# Patient Record
Sex: FEMALE | Race: WHITE | NOT HISPANIC OR LATINO | Employment: PART TIME | URBAN - METROPOLITAN AREA
[De-identification: names, ages, dates, MRNs, and addresses within clinical notes are randomized per-mention and may not be internally consistent; named-entity substitution may affect disease eponyms.]

---

## 2017-04-14 ENCOUNTER — HOSPITAL ENCOUNTER (EMERGENCY)
Facility: HOSPITAL | Age: 22
Discharge: HOME | End: 2017-04-14
Attending: SURGERY
Payer: COMMERCIAL

## 2017-04-14 VITALS
DIASTOLIC BLOOD PRESSURE: 76 MMHG | SYSTOLIC BLOOD PRESSURE: 118 MMHG | RESPIRATION RATE: 16 BRPM | WEIGHT: 140 LBS | HEART RATE: 80 BPM | TEMPERATURE: 98 F

## 2017-04-14 DIAGNOSIS — R51.9 NONINTRACTABLE HEADACHE, UNSPECIFIED CHRONICITY PATTERN, UNSPECIFIED HEADACHE TYPE: ICD-10-CM
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
## 2017-04-14 PROCEDURE — 25000003 PHARM REV CODE 250: Performed by: SURGERY

## 2017-04-14 PROCEDURE — 99284 EMERGENCY DEPT VISIT MOD MDM: CPT

## 2017-04-14 RX ORDER — KETOROLAC TROMETHAMINE 10 MG/1
10 TABLET, FILM COATED ORAL EVERY 6 HOURS PRN
Qty: 15 TABLET | Refills: 0 | Status: SHIPPED | OUTPATIENT
Start: 2017-04-14 | End: 2018-05-07

## 2017-04-14 RX ORDER — ACETAMINOPHEN 500 MG
1000 TABLET ORAL
Status: COMPLETED | OUTPATIENT
Start: 2017-04-14 | End: 2017-04-14

## 2017-04-14 RX ORDER — CYCLOBENZAPRINE HCL 10 MG
10 TABLET ORAL 3 TIMES DAILY PRN
Qty: 10 TABLET | Refills: 0 | Status: SHIPPED | OUTPATIENT
Start: 2017-04-14 | End: 2017-04-19

## 2017-04-14 RX ADMIN — ACETAMINOPHEN 1000 MG: 500 TABLET ORAL at 10:04
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
## 2017-04-15 NOTE — DISCHARGE INSTRUCTIONS
"  Headache, Unspecified    A number of things can cause headaches. The cause of your headache isnt clear. But it doesnt seem to be a sign of any serious illness.  You could have a tension headache or a migraine headache.  Stress can cause a tension headache. This can happen if you tense the muscles of your shoulders, neck, and scalp without knowing it. If this stress lasts long enough, you may develop a tension headache.  It is not clear why migraines occur, but certain things called" triggers" can raise the risk of having a migraine attack. Migraine triggers may include emotional stress or depression, or by hormone changes during the menstrual cycle. Other triggers include birth control pills and other medicines, alcohol or caffeine, foods with tyramine (such as aged cheese, wine), eyestrain, weather changes, missed meals, and lack of sleep or oversleeping.  Other causes of headache include:  · Viral illness with high fever  · Head injury with concussion  · Sinus, ear, or throat infection  · Dental pain and jaw joint (TMJ) pain  More serious but less common causes of headache include stroke, brain hemorrhage, brain tumor, meningitis, and encephalitis.  Home care  Follow these tips when taking care of yourself at home:  · Dont drive yourself home if you were given pain medicine for your headache. Instead, have someone else drive you home. Try to sleep when you get home. You should feel much better when you wake up.  · Apply heat to the back of your neck to ease a neck muscle spasm. Take care of a migraine headache by putting an ice pack on your forehead or at the base of your skull.  · If you have nausea or vomiting, eat a light diet until your headache eases.  · If you have a migraine headache, use sunglasses when in the daylight or around bright indoor lighting until your symptoms get better. Bright glaring light can make this type of headache worse.  Follow-up care  Follow up with your healthcare provider, or " as advised. Talk with your provider if you have frequent headaches. He or she can help figure out a treatment plan. By knowing the earliest signs of headache, and starting treatment right away, you may be able to stop the pain yourself.  When to seek medical advice  Call your healthcare provider right away if any of these occur:  · Your head pain suddenly gets worse after sexual intercourse or strenuous activity  · Your head pain doesnt get better within 24 hours  · You arent able to keep liquids down (repeated vomiting)  · Fever of 100.4ºF (38ºC) or higher, or as directed by your healthcare provider  · Stiff neck  · Extreme drowsiness, confusion, or fainting  · Dizziness or dizziness with spinning sensation (vertigo)  · Weakness in an arm or leg or one side of your face  · You have trouble talking or seeing  Date Last Reviewed: 8/1/2016  © 0160-5106 [employer]. [address] [place] [address], [place] [address]. All rights reserved. This information is not intended as a substitute for professional medical care. Always follow your healthcare professional's instructions.
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
## 2018-05-29 ENCOUNTER — LAB VISIT (OUTPATIENT)
Dept: LAB | Facility: HOSPITAL | Age: 23
End: 2018-05-29
Attending: OBSTETRICS & GYNECOLOGY
Payer: MEDICAID

## 2018-05-29 ENCOUNTER — OFFICE VISIT (OUTPATIENT)
Dept: OBSTETRICS AND GYNECOLOGY | Facility: CLINIC | Age: 23
End: 2018-05-29
Payer: MEDICAID

## 2018-05-29 VITALS
DIASTOLIC BLOOD PRESSURE: 80 MMHG | SYSTOLIC BLOOD PRESSURE: 120 MMHG | HEIGHT: 63 IN | WEIGHT: 194 LBS | HEART RATE: 88 BPM | RESPIRATION RATE: 10 BRPM | BODY MASS INDEX: 34.38 KG/M2

## 2018-05-29 DIAGNOSIS — Z32.01 POSITIVE PREGNANCY TEST: ICD-10-CM

## 2018-05-29 DIAGNOSIS — Z11.3 SCREEN FOR STD (SEXUALLY TRANSMITTED DISEASE): ICD-10-CM

## 2018-05-29 DIAGNOSIS — Z12.4 CERVICAL CANCER SCREENING: ICD-10-CM

## 2018-05-29 DIAGNOSIS — N91.2 AMENORRHEA: Primary | ICD-10-CM

## 2018-05-29 LAB
ABO + RH BLD: NORMAL
ALBUMIN SERPL BCP-MCNC: 4 G/DL
ALP SERPL-CCNC: 44 U/L
ALT SERPL W/O P-5'-P-CCNC: 18 U/L
ANION GAP SERPL CALC-SCNC: 9 MMOL/L
AST SERPL-CCNC: 17 U/L
B-HCG UR QL: POSITIVE
BASOPHILS # BLD AUTO: 0.04 K/UL
BASOPHILS NFR BLD: 0.3 %
BILIRUB SERPL-MCNC: 0.3 MG/DL
BLD GP AB SCN CELLS X3 SERPL QL: NORMAL
BUN SERPL-MCNC: 6 MG/DL
CALCIUM SERPL-MCNC: 9.4 MG/DL
CHLORIDE SERPL-SCNC: 105 MMOL/L
CO2 SERPL-SCNC: 24 MMOL/L
CREAT SERPL-MCNC: 0.7 MG/DL
CTP QC/QA: YES
DIFFERENTIAL METHOD: ABNORMAL
EOSINOPHIL # BLD AUTO: 0.1 K/UL
EOSINOPHIL NFR BLD: 1.1 %
ERYTHROCYTE [DISTWIDTH] IN BLOOD BY AUTOMATED COUNT: 12.3 %
EST. GFR  (AFRICAN AMERICAN): >60 ML/MIN/1.73 M^2
EST. GFR  (NON AFRICAN AMERICAN): >60 ML/MIN/1.73 M^2
GLUCOSE SERPL-MCNC: 87 MG/DL
HCT VFR BLD AUTO: 38.9 %
HGB BLD-MCNC: 13.3 G/DL
LYMPHOCYTES # BLD AUTO: 3.2 K/UL
LYMPHOCYTES NFR BLD: 26.9 %
MCH RBC QN AUTO: 32.8 PG
MCHC RBC AUTO-ENTMCNC: 34.2 G/DL
MCV RBC AUTO: 96 FL
MONOCYTES # BLD AUTO: 1 K/UL
MONOCYTES NFR BLD: 8.7 %
NEUTROPHILS # BLD AUTO: 7.4 K/UL
NEUTROPHILS NFR BLD: 63 %
PLATELET # BLD AUTO: 308 K/UL
PMV BLD AUTO: 10.7 FL
POTASSIUM SERPL-SCNC: 4.1 MMOL/L
PROT SERPL-MCNC: 7.3 G/DL
RBC # BLD AUTO: 4.06 M/UL
SODIUM SERPL-SCNC: 138 MMOL/L
WBC # BLD AUTO: 11.71 K/UL

## 2018-05-29 PROCEDURE — 81025 URINE PREGNANCY TEST: CPT | Mod: PBBFAC | Performed by: OBSTETRICS & GYNECOLOGY

## 2018-05-29 PROCEDURE — 85025 COMPLETE CBC W/AUTO DIFF WBC: CPT

## 2018-05-29 PROCEDURE — 81220 CFTR GENE COM VARIANTS: CPT

## 2018-05-29 PROCEDURE — 88175 CYTOPATH C/V AUTO FLUID REDO: CPT

## 2018-05-29 PROCEDURE — 86703 HIV-1/HIV-2 1 RESULT ANTBDY: CPT

## 2018-05-29 PROCEDURE — 80053 COMPREHEN METABOLIC PANEL: CPT

## 2018-05-29 PROCEDURE — 99204 OFFICE O/P NEW MOD 45 MIN: CPT | Mod: 25,S$PBB,TH, | Performed by: OBSTETRICS & GYNECOLOGY

## 2018-05-29 PROCEDURE — 80307 DRUG TEST PRSMV CHEM ANLYZR: CPT

## 2018-05-29 PROCEDURE — 86762 RUBELLA ANTIBODY: CPT

## 2018-05-29 PROCEDURE — 99999 PR PBB SHADOW E&M-EST. PATIENT-LVL III: CPT | Mod: PBBFAC,,, | Performed by: OBSTETRICS & GYNECOLOGY

## 2018-05-29 PROCEDURE — 86901 BLOOD TYPING SEROLOGIC RH(D): CPT

## 2018-05-29 PROCEDURE — 99213 OFFICE O/P EST LOW 20 MIN: CPT | Mod: PBBFAC,TH,25 | Performed by: OBSTETRICS & GYNECOLOGY

## 2018-05-29 PROCEDURE — 36415 COLL VENOUS BLD VENIPUNCTURE: CPT

## 2018-05-29 PROCEDURE — 86592 SYPHILIS TEST NON-TREP QUAL: CPT

## 2018-05-29 PROCEDURE — 87491 CHLMYD TRACH DNA AMP PROBE: CPT

## 2018-05-29 PROCEDURE — 87340 HEPATITIS B SURFACE AG IA: CPT

## 2018-05-29 RX ORDER — PROMETHAZINE HYDROCHLORIDE 25 MG/1
25 TABLET ORAL EVERY 6 HOURS PRN
Qty: 30 TABLET | Refills: 0 | Status: SHIPPED | OUTPATIENT
Start: 2018-05-29 | End: 2018-06-28

## 2018-05-29 RX ORDER — BUSPIRONE HYDROCHLORIDE 5 MG/1
5 TABLET ORAL 2 TIMES DAILY
Qty: 60 TABLET | Refills: 1 | Status: SHIPPED | OUTPATIENT
Start: 2018-05-29 | End: 2018-07-06 | Stop reason: SDUPTHER

## 2018-05-29 RX ORDER — ONDANSETRON 4 MG/1
4 TABLET, FILM COATED ORAL EVERY 8 HOURS PRN
Qty: 30 TABLET | Refills: 1 | Status: SHIPPED | OUTPATIENT
Start: 2018-05-29 | End: 2018-07-06 | Stop reason: SDUPTHER

## 2018-05-29 NOTE — PROGRESS NOTES
Subjective:   Patient ID: Wilmer Honeycutt is a 22 y.o. y.o. female.     Chief Complaint: Missed Menses       History of Present Illness:    Wilmer presents today complaining of amenorrhea. Patient's last menstrual period was 04/19/2018.. Prior menstrual cycles have been regular. She reports positive home pregnancy test. UPT is positive.    Patient reports having severe nausea and requesting medication. She also suffers from depression and anxiety and reports her anxiety is much worse lately; requesting medication.     Denies any vaginal bleeding or cramping.        History reviewed. No pertinent past medical history.  History reviewed. No pertinent surgical history.  Social History     Social History    Marital status: Single     Spouse name: N/A    Number of children: N/A    Years of education: N/A     Social History Main Topics    Smoking status: Current Every Day Smoker     Packs/day: 0.25     Types: Cigarettes    Smokeless tobacco: Never Used    Alcohol use Yes      Comment: occ    Drug use: No    Sexual activity: Not Asked      Comment: Single     Other Topics Concern    None     Social History Narrative    None     Family History   Problem Relation Age of Onset    Ovarian cancer Maternal Grandmother     Colon cancer Neg Hx     Breast cancer Neg Hx      OB History   No data available         ROS:   Constitutional: Negative for chills, fever and weight loss.   HENT: Negative for congestion, ear discharge, hearing loss and nosebleeds.   Eyes: Negative for blurred vision and double vision.   Respiratory: Negative for cough, hemoptysis, sputum production and shortness of breath.   Cardiovascular: Negative for chest pain, palpitations and orthopnea.   Gastrointestinal: Negative for abdominal pain, heartburn, nausea and vomiting.   Genitourinary: Negative for dysuria, frequency, hematuria and urgency.   Musculoskeletal: Negative for back pain, myalgias and neck pain.   Skin: Negative for itching and  rash.   Neurological: Negative for dizziness, tingling, tremors and headaches.   Endo/Heme/Allergies: Negative for environmental allergies and polydipsia. Does not bruise/bleed easily.   Psychiatric/Behavioral: Negative for depression, hallucinations and substance abuse. The patient is not nervous/anxious.       Objective:   Vital Signs:  There were no vitals filed for this visit.      Physical Exam:  General:  alert, cooperative, appears stated age   Skin:  Skin color, texture, turgor normal. No rashes or lesions   HEENT:  conjunctivae/corneas clear. PERRL.   Neck: supple, trachea midline, no adenopathy or thyromegally   Respiratory:  clear to auscultation bilaterally   Heart:  regular rate and rhythm, S1, S2 normal, no murmur, click, rub or gallop   Breasts:  no discharge, erythema, or tenderness   Abdomen:  normal findings: bowel sounds normal, no masses palpable, no organomegaly and soft, non-tender   Pelvis: External genitalia: normal general appearance  Urinary system: urethral meatus normal, bladder nontender  Vaginal: normal mucosa without prolapse or lesions  Cervix: normal appearance  Uterus: normal size, shape, position  Adnexa: normal size, nontender bilaterally   Extremities: Normal ROM; no edema, no cyanosis   Neurologial: Normal strength and tone. No focal numbness or weakness. Reflexes 2+ and equal.   Psychiatric: normal mood, speech, dress, and thought processes     Assessment:      1. Amenorrhea    2. Positive pregnancy test    3. Screen for STD (sexually transmitted disease)    4. Cervical cancer screening          Plan:      Amenorrhea  -     US OB/GYN Procedure ([facility]) - Extended List - Future; Future    Positive pregnancy test  -     Comprehensive metabolic panel; Future; Expected date: 05/29/2018  -     Type & Screen - Ob Profile; Future; Expected date: 05/29/2018  -     CBC auto differential; Future; Expected date: 05/29/2018  -     HIV-1 and HIV-2 antibodies; Future; Expected date:  05/29/2018  -     Hepatitis B surface antigen; Future; Expected date: 05/29/2018  -     Rubella antibody, IgG; Future; Expected date: 05/29/2018  -     RPR; Future; Expected date: 05/29/2018  -     Cystic Fibrosis Mutation Panel; Future; Expected date: 05/29/2018    Screen for STD (sexually transmitted disease)  -     C. trachomatis/N. gonorrhoeae by AMP DNA Cervix    Cervical cancer screening  -     Liquid-based pap smear, screening      Pap smear  GC/CT  Rx of Zofran  Rx of Buspar  Dating scan next week  PNV  Prenatal labs  RTC in 4 weeks      Patient was counseled today on proper weight gain based on the [facility] [employer] [facility]'s recommendations based on her pre-pregnancy weight. Discussed foods to avoid in pregnancy (i.e. sushi, fish that are high in mercury, deli meat, and unpasteurized cheeses). Discussed prenatal vitamin options (i.e. stool softener, DHA). She was also counseled on safe, healthy behavior as well as medications safe in pregnancy.

## 2018-05-30 LAB
AMPHET+METHAMPHET UR QL: NEGATIVE
BARBITURATES UR QL SCN>200 NG/ML: NEGATIVE
BENZODIAZ UR QL SCN>200 NG/ML: NEGATIVE
BZE UR QL SCN: NEGATIVE
C TRACH DNA SPEC QL NAA+PROBE: DETECTED
CANNABINOIDS UR QL SCN: NEGATIVE
CREAT UR-MCNC: 269 MG/DL
ETHANOL UR-MCNC: <10 MG/DL
HBV SURFACE AG SERPL QL IA: NEGATIVE
HIV 1+2 AB+HIV1 P24 AG SERPL QL IA: NEGATIVE
METHADONE UR QL SCN>300 NG/ML: NEGATIVE
N GONORRHOEA DNA SPEC QL NAA+PROBE: NOT DETECTED
OPIATES UR QL SCN: NEGATIVE
PCP UR QL SCN>25 NG/ML: NEGATIVE
RPR SER QL: NORMAL
RUBV IGG SER-ACNC: 19 IU/ML
RUBV IGG SER-IMP: REACTIVE
TOXICOLOGY INFORMATION: NORMAL

## 2018-05-31 DIAGNOSIS — A74.9 CHLAMYDIA INFECTION AFFECTING PREGNANCY, ANTEPARTUM: ICD-10-CM

## 2018-05-31 DIAGNOSIS — O98.819 CHLAMYDIA INFECTION AFFECTING PREGNANCY, ANTEPARTUM: ICD-10-CM

## 2018-05-31 RX ORDER — AZITHROMYCIN 250 MG/1
TABLET, FILM COATED ORAL
Qty: 4 TABLET | Refills: 0 | Status: SHIPPED | OUTPATIENT
Start: 2018-05-31 | End: 2018-06-05

## 2018-06-01 LAB — CFTR MUT ANL BLD/T: NORMAL

## 2018-06-04 ENCOUNTER — TELEPHONE (OUTPATIENT)
Dept: OBSTETRICS AND GYNECOLOGY | Facility: CLINIC | Age: 23
End: 2018-06-04

## 2018-06-05 ENCOUNTER — PROCEDURE VISIT (OUTPATIENT)
Dept: OBSTETRICS AND GYNECOLOGY | Facility: CLINIC | Age: 23
End: 2018-06-05
Payer: MEDICAID

## 2018-06-05 DIAGNOSIS — N91.2 AMENORRHEA: ICD-10-CM

## 2018-06-05 PROCEDURE — 76805 OB US >/= 14 WKS SNGL FETUS: CPT | Mod: PBBFAC | Performed by: OBSTETRICS & GYNECOLOGY

## 2018-06-05 NOTE — Clinical Note
Leticia Joyner is asking about the u/s performed on 6/5/18.  There is no image interpretation on the u/s.  Please advise.   Kindly,   Grecia

## 2018-06-08 RX ORDER — METRONIDAZOLE 500 MG/1
500 TABLET ORAL EVERY 12 HOURS
Qty: 14 TABLET | Refills: 0 | Status: SHIPPED | OUTPATIENT
Start: 2018-06-08 | End: 2018-06-15

## 2018-06-08 RX ORDER — PROMETHAZINE HYDROCHLORIDE 25 MG/1
25 TABLET ORAL EVERY 6 HOURS PRN
Qty: 30 TABLET | Refills: 0 | Status: CANCELLED | OUTPATIENT
Start: 2018-06-08 | End: 2018-07-08

## 2018-06-08 NOTE — TELEPHONE ENCOUNTER
Explained to pt about her pap and her positive vaginosis. Pt is having more milky discharge. No itching and has some odor.

## 2018-06-08 NOTE — TELEPHONE ENCOUNTER
----- Message from Shruthi Tobar MA sent at 6/8/2018 10:14 AM CDT -----  Contact: SELF  Wilmer Honeycutt  MRN: [id number]  Home Phone      [phone]  Work Phone      Not on file.  Mobile          [phone]    Patient Care Team:  Primary Doctor No as PCP - General  Tita Castillo MD (Obstetrics and Gynecology)  OB? Yes, Unknown  What phone number can you be reached at?   [phone]  Message:   Needs refill on promethazine .  PHARMACY:  [facility]

## 2018-06-08 NOTE — TELEPHONE ENCOUNTER
Newly diagnosed ob states she has taken 30 Phenergan since 5/29/18. States she is having to take medication 3-4 times daily. Patient is not using Zofran. Requesting refill. Please advise.

## 2018-06-08 NOTE — TELEPHONE ENCOUNTER
Please let patient know I have sent in a Rx for the BV. Also please inquire if she is taking the Zofran with the phenergan. I am concerned she has needed 30 pills in ~ 1 week.

## 2018-06-08 NOTE — TELEPHONE ENCOUNTER
Patient notified BV medication has been sent to her pharmacy. Patient states she is not taking Zofran for nausea only Phenergan. Dr. Mary notified, states Phenergan will not be filled at this time, instruct patient to take Zofran. Patient verbalized understanding with no questions or concerns.

## 2018-06-08 NOTE — TELEPHONE ENCOUNTER
----- Message from Jennifer Finch MD sent at 6/2/2018  8:47 PM CDT -----  Please notify patient that her Pap is normal.  However, there was bacterial vaginosis noted.  This would typically be treated in her second trimester unless she is having any bothersome vaginal discharge or irritation prior to that time.

## 2018-07-06 ENCOUNTER — INITIAL PRENATAL (OUTPATIENT)
Dept: OBSTETRICS AND GYNECOLOGY | Facility: CLINIC | Age: 23
End: 2018-07-06
Payer: MEDICAID

## 2018-07-06 VITALS
WEIGHT: 191 LBS | HEART RATE: 80 BPM | DIASTOLIC BLOOD PRESSURE: 60 MMHG | BODY MASS INDEX: 33.83 KG/M2 | SYSTOLIC BLOOD PRESSURE: 110 MMHG

## 2018-07-06 DIAGNOSIS — Z34.01 ENCOUNTER FOR SUPERVISION OF NORMAL FIRST PREGNANCY IN FIRST TRIMESTER: ICD-10-CM

## 2018-07-06 PROCEDURE — 99213 OFFICE O/P EST LOW 20 MIN: CPT | Mod: TH,S$PBB,, | Performed by: OBSTETRICS & GYNECOLOGY

## 2018-07-06 PROCEDURE — 99999 PR PBB SHADOW E&M-EST. PATIENT-LVL II: CPT | Mod: PBBFAC,,, | Performed by: OBSTETRICS & GYNECOLOGY

## 2018-07-06 PROCEDURE — 99212 OFFICE O/P EST SF 10 MIN: CPT | Mod: PBBFAC,TH | Performed by: OBSTETRICS & GYNECOLOGY

## 2018-07-06 RX ORDER — PROMETHAZINE HYDROCHLORIDE 25 MG/1
25 TABLET ORAL EVERY 6 HOURS PRN
Qty: 30 TABLET | Refills: 0 | Status: SHIPPED | OUTPATIENT
Start: 2018-07-06 | End: 2018-08-02

## 2018-07-06 RX ORDER — AZITHROMYCIN 250 MG/1
TABLET, FILM COATED ORAL
Qty: 4 TABLET | Refills: 0 | Status: SHIPPED | OUTPATIENT
Start: 2018-07-06 | End: 2018-07-11

## 2018-07-06 RX ORDER — ONDANSETRON 4 MG/1
4 TABLET, FILM COATED ORAL EVERY 8 HOURS PRN
Qty: 30 TABLET | Refills: 0 | Status: SHIPPED | OUTPATIENT
Start: 2018-07-06 | End: 2018-08-02

## 2018-07-06 RX ORDER — BUSPIRONE HYDROCHLORIDE 5 MG/1
5 TABLET ORAL 3 TIMES DAILY
Qty: 90 TABLET | Refills: 1 | Status: SHIPPED | OUTPATIENT
Start: 2018-07-06 | End: 2018-08-28

## 2018-07-06 NOTE — PROGRESS NOTES
Reviewed prenatal labs with patient. Reviewed dating criteria. Discussed proper nutrition and weight gain in pregnancy. No vaginal bleeding or cramping noted. SAB precautions discussed. Patient to RTC in 4 weeks.     Patient reports Buspar not helping; discussed increasing to TID. Refill Zofran and Phenergan. Patient reports throwing up Azithromycin. Will send new Rx.     Initial ob packet supplied to patient. Contents supplied and discussed include medications safe in pregnancy, pregnancy A to Z, class schedule, pregnancy checklist, car seat safety, and handout on skin to skin and breastfeeding. Coeffective jessica card supplied and discussed.

## 2018-08-02 ENCOUNTER — ROUTINE PRENATAL (OUTPATIENT)
Dept: OBSTETRICS AND GYNECOLOGY | Facility: CLINIC | Age: 23
End: 2018-08-02
Payer: MEDICAID

## 2018-08-02 VITALS
WEIGHT: 192 LBS | DIASTOLIC BLOOD PRESSURE: 70 MMHG | HEART RATE: 78 BPM | SYSTOLIC BLOOD PRESSURE: 122 MMHG | BODY MASS INDEX: 34.01 KG/M2

## 2018-08-02 DIAGNOSIS — O98.812 CHLAMYDIA INFECTION AFFECTING PREGNANCY IN SECOND TRIMESTER: ICD-10-CM

## 2018-08-02 DIAGNOSIS — A74.9 CHLAMYDIA INFECTION AFFECTING PREGNANCY IN SECOND TRIMESTER: ICD-10-CM

## 2018-08-02 DIAGNOSIS — Z34.02 ENCOUNTER FOR SUPERVISION OF NORMAL FIRST PREGNANCY IN SECOND TRIMESTER: Primary | ICD-10-CM

## 2018-08-02 DIAGNOSIS — Z3A.15 15 WEEKS GESTATION OF PREGNANCY: ICD-10-CM

## 2018-08-02 DIAGNOSIS — Z36.3 ENCOUNTER FOR ROUTINE SCREENING FOR MALFORMATION USING ULTRASONICS: ICD-10-CM

## 2018-08-02 PROCEDURE — 99212 OFFICE O/P EST SF 10 MIN: CPT | Mod: PBBFAC,TH | Performed by: OBSTETRICS & GYNECOLOGY

## 2018-08-02 PROCEDURE — 87491 CHLMYD TRACH DNA AMP PROBE: CPT

## 2018-08-02 PROCEDURE — 99213 OFFICE O/P EST LOW 20 MIN: CPT | Mod: TH,S$PBB,, | Performed by: OBSTETRICS & GYNECOLOGY

## 2018-08-02 PROCEDURE — 99999 PR PBB SHADOW E&M-EST. PATIENT-LVL II: CPT | Mod: PBBFAC,,, | Performed by: OBSTETRICS & GYNECOLOGY

## 2018-08-02 NOTE — PROGRESS NOTES
Patient doing well. No vaginal bleeding or cramping noted. Discussed the need for an anatomy scan between 18-20 weeks. Discussed quad screen. RTC in 4 weeks.     Coffective counseling sheet Get Ready discussed with mother. Reinforced avoiding induction of labor unless medically indicated as well as comfort measures during labor.  Encouraged mother to download Coffective mobile jessica if she has not already done so. Mother verbalizes understanding.

## 2018-08-04 LAB
C TRACH DNA SPEC QL NAA+PROBE: NOT DETECTED
N GONORRHOEA DNA SPEC QL NAA+PROBE: NOT DETECTED

## 2018-08-28 ENCOUNTER — ROUTINE PRENATAL (OUTPATIENT)
Dept: OBSTETRICS AND GYNECOLOGY | Facility: CLINIC | Age: 23
End: 2018-08-28
Payer: MEDICAID

## 2018-08-28 VITALS
DIASTOLIC BLOOD PRESSURE: 84 MMHG | BODY MASS INDEX: 34.12 KG/M2 | SYSTOLIC BLOOD PRESSURE: 132 MMHG | HEART RATE: 77 BPM | WEIGHT: 192.63 LBS

## 2018-08-28 DIAGNOSIS — Z3A.18 18 WEEKS GESTATION OF PREGNANCY: ICD-10-CM

## 2018-08-28 DIAGNOSIS — Z34.02 ENCOUNTER FOR SUPERVISION OF NORMAL FIRST PREGNANCY IN SECOND TRIMESTER: Primary | ICD-10-CM

## 2018-08-28 PROCEDURE — 99213 OFFICE O/P EST LOW 20 MIN: CPT | Mod: TH,S$PBB,, | Performed by: OBSTETRICS & GYNECOLOGY

## 2018-08-28 PROCEDURE — 99999 PR PBB SHADOW E&M-EST. PATIENT-LVL III: CPT | Mod: PBBFAC,,, | Performed by: OBSTETRICS & GYNECOLOGY

## 2018-08-28 PROCEDURE — 99213 OFFICE O/P EST LOW 20 MIN: CPT | Mod: PBBFAC,TH,PO | Performed by: OBSTETRICS & GYNECOLOGY

## 2018-08-28 RX ORDER — ONDANSETRON 4 MG/1
8 TABLET, FILM COATED ORAL 2 TIMES DAILY
COMMUNITY
End: 2019-06-21

## 2018-08-28 RX ORDER — PROMETHAZINE HYDROCHLORIDE 25 MG/1
25 TABLET ORAL EVERY 4 HOURS
COMMUNITY
End: 2019-06-21

## 2018-08-28 NOTE — PROGRESS NOTES
Patient with no complaints. Denies vaginal bleeding or cramping.  Patient desires quad screen. Anatomy scan done. RTC in 4 weeks

## 2018-09-17 ENCOUNTER — TELEPHONE (OUTPATIENT)
Dept: OBSTETRICS AND GYNECOLOGY | Facility: CLINIC | Age: 23
End: 2018-09-17

## 2018-09-17 RX ORDER — BUSPIRONE HYDROCHLORIDE 7.5 MG/1
7.5 TABLET ORAL 3 TIMES DAILY
Qty: 30 TABLET | Refills: 0 | Status: SHIPPED | OUTPATIENT
Start: 2018-09-17 | End: 2018-09-18 | Stop reason: ALTCHOICE

## 2018-09-17 NOTE — TELEPHONE ENCOUNTER
Called and notified buspar dose increased to 7.5 mg to be taken three times per day.  Advised her that she should come in to see Dr. Mary regarding her symptoms this week or early next week.  She denies suicidal and homicidal ideation.   Pt was given strict precautions regarding depression. States she would be interested in seeing counselor. Please determine listing of local counselors (for patient's area; she lives in [place]) and notify patient.  She states she plans to try to see if she can get transportation to come to [facility] tomorrow and will call if she can.

## 2018-09-18 ENCOUNTER — ROUTINE PRENATAL (OUTPATIENT)
Dept: OBSTETRICS AND GYNECOLOGY | Facility: CLINIC | Age: 23
End: 2018-09-18
Payer: MEDICAID

## 2018-09-18 ENCOUNTER — NURSE TRIAGE (OUTPATIENT)
Dept: ADMINISTRATIVE | Facility: CLINIC | Age: 23
End: 2018-09-18

## 2018-09-18 VITALS
HEART RATE: 88 BPM | BODY MASS INDEX: 34.79 KG/M2 | DIASTOLIC BLOOD PRESSURE: 78 MMHG | SYSTOLIC BLOOD PRESSURE: 126 MMHG | WEIGHT: 196.38 LBS

## 2018-09-18 DIAGNOSIS — F32.A DEPRESSION AFFECTING PREGNANCY IN SECOND TRIMESTER, ANTEPARTUM: Primary | ICD-10-CM

## 2018-09-18 DIAGNOSIS — O99.342 DEPRESSION AFFECTING PREGNANCY IN SECOND TRIMESTER, ANTEPARTUM: Primary | ICD-10-CM

## 2018-09-18 PROCEDURE — 99999 PR PBB SHADOW E&M-EST. PATIENT-LVL III: CPT | Mod: PBBFAC,,, | Performed by: OBSTETRICS & GYNECOLOGY

## 2018-09-18 PROCEDURE — 99213 OFFICE O/P EST LOW 20 MIN: CPT | Mod: TH,S$PBB,, | Performed by: OBSTETRICS & GYNECOLOGY

## 2018-09-18 PROCEDURE — 99213 OFFICE O/P EST LOW 20 MIN: CPT | Mod: PBBFAC,TH,PO | Performed by: OBSTETRICS & GYNECOLOGY

## 2018-09-18 RX ORDER — FLUOXETINE HYDROCHLORIDE 20 MG/1
20 CAPSULE ORAL DAILY
Qty: 30 CAPSULE | Refills: 11 | Status: SHIPPED | OUTPATIENT
Start: 2018-09-18 | End: 2018-11-19 | Stop reason: SDUPTHER

## 2018-09-18 NOTE — TELEPHONE ENCOUNTER
Attempted to contact pt via telephone to f/u on providing [place] in [place] and scheduling a f/u appt.  MARIKA.

## 2018-09-18 NOTE — TELEPHONE ENCOUNTER
Pt states transportation is an issue and unable to schedule a f/u in [place] today or in [place] this week.  Provided phone number to [facility] [phone] and instructed to schedule appt.  Pt verbalized understanding.  States if transportation becomes available she will contact office to obtain appt.

## 2018-09-18 NOTE — TELEPHONE ENCOUNTER
"    Reason for Disposition   [1] Started on anti-anxiety medication AND [2] no relief    Protocols used: ST ANXIETY AND PANIC ATTACK-A-    Wilmer states she was seen today for anxiety/depression and was told if she needed to be admitted that could be a possibility.  She denies suicidal thoughts right now or thoughts of hurting herself, but states she is very anxious and depressed which was something she suffered with prior to pregnancy that has gotten worse since becoming pregnant. She says she lives with her godmother, who is supportive of her. She says "I want help to deal with anxiety". She was prescribed a new medication today but has not started it yet. She says she is not currently seen by a psychiatrist or counselor.  She wanted to know if she went to ED now, would there be any repercussions to her later when child is born. Clarified with caller that she was asking if her child would be taken from her later if she was seeking help now. She also wanted to know if it could be a possibility of her being admitted tonight. Spoke to Dr. Castillo (her OB) who states if she wanted to go to ED  that ED would evaluate her and see if she meets criteria to be admitted tonight. She also reassured that she should not have any repercussions to her for seeking help. Advised caller.  "

## 2018-09-18 NOTE — PROGRESS NOTES
Pt presents to discuss increasing anxiety and depression that she feels in refractory to Buspar.  She reports symptoms have worsened despite increasing doses.  She has a history of cutting, but has not cut her arms in over 2 years.  She feels the urge is coming back to cut, and she is seeking help.  She has been reluctant to seek help out of fear of losing her child.  She denies any suicidal or homicidal ideations today.  She is interested in alternative medication that may help her symptoms better.  Will switch to Prozac.  Discussed risk/benefits with patient of SSRI in pregnancy. We spent a long time discussing ER precautions and when to seek additional help.  She reports that her family is aware of her current issues.  We will keep close follow up and see her in our clinic in 1 week.

## 2018-09-25 ENCOUNTER — ROUTINE PRENATAL (OUTPATIENT)
Dept: OBSTETRICS AND GYNECOLOGY | Facility: CLINIC | Age: 23
End: 2018-09-25
Payer: MEDICAID

## 2018-09-25 VITALS
DIASTOLIC BLOOD PRESSURE: 60 MMHG | SYSTOLIC BLOOD PRESSURE: 110 MMHG | BODY MASS INDEX: 34.21 KG/M2 | WEIGHT: 193.13 LBS | HEART RATE: 84 BPM

## 2018-09-25 DIAGNOSIS — O99.342 ANXIETY DURING PREGNANCY IN SECOND TRIMESTER, ANTEPARTUM: ICD-10-CM

## 2018-09-25 DIAGNOSIS — F32.A DEPRESSION AFFECTING PREGNANCY IN SECOND TRIMESTER, ANTEPARTUM: ICD-10-CM

## 2018-09-25 DIAGNOSIS — O99.342 DEPRESSION AFFECTING PREGNANCY IN SECOND TRIMESTER, ANTEPARTUM: ICD-10-CM

## 2018-09-25 DIAGNOSIS — Z34.02 ENCOUNTER FOR SUPERVISION OF NORMAL FIRST PREGNANCY, SECOND TRIMESTER: Primary | ICD-10-CM

## 2018-09-25 DIAGNOSIS — F41.9 ANXIETY DURING PREGNANCY IN SECOND TRIMESTER, ANTEPARTUM: ICD-10-CM

## 2018-09-25 PROCEDURE — 99212 OFFICE O/P EST SF 10 MIN: CPT | Mod: PBBFAC,TH,PO | Performed by: OBSTETRICS & GYNECOLOGY

## 2018-09-25 PROCEDURE — 99999 PR PBB SHADOW E&M-EST. PATIENT-LVL II: CPT | Mod: PBBFAC,,, | Performed by: OBSTETRICS & GYNECOLOGY

## 2018-09-25 PROCEDURE — 99213 OFFICE O/P EST LOW 20 MIN: CPT | Mod: TH,S$PBB,, | Performed by: OBSTETRICS & GYNECOLOGY

## 2018-09-25 NOTE — PROGRESS NOTES
"Reports her mood is better but she feels "disoriented."  States she has walked into her colleagues because of misjudging distance.  States she also feels like she is processing things more slowly.  States she has been having diarrhea but staying hydrated. States she still has some anxiety but feels it is more manageable now.  She is seeing a counselor now who diagnosed her with major depression.  She denies suicidal/homicidal ideation.  She has occasional pain in her rib cage and is unsure if this may be work-related as she has to reach a lot and lift.    No vaginal bleeding, leakage of fluid, or contractions.  +FM.   Strict precautions given regarding depression/anxiety. Will continue prozac as prescribed at this time.  Discussed whether medication dosage needs to be decreased but patient states she prefers to continue at current dose and will readdress at her follow-up visit next week. Pt to continue follow-up with counselor which is scheduled for this week.  Letter for work written.  PTL precautions.  Fetal movements discussed.     "

## 2018-10-02 ENCOUNTER — ROUTINE PRENATAL (OUTPATIENT)
Dept: OBSTETRICS AND GYNECOLOGY | Facility: CLINIC | Age: 23
End: 2018-10-02
Payer: MEDICAID

## 2018-10-02 VITALS
HEART RATE: 88 BPM | BODY MASS INDEX: 33.83 KG/M2 | DIASTOLIC BLOOD PRESSURE: 68 MMHG | SYSTOLIC BLOOD PRESSURE: 120 MMHG | WEIGHT: 191 LBS

## 2018-10-02 DIAGNOSIS — F32.A DEPRESSION AFFECTING PREGNANCY IN SECOND TRIMESTER, ANTEPARTUM: ICD-10-CM

## 2018-10-02 DIAGNOSIS — O99.342 DEPRESSION AFFECTING PREGNANCY IN SECOND TRIMESTER, ANTEPARTUM: ICD-10-CM

## 2018-10-02 DIAGNOSIS — Z34.02 ENCOUNTER FOR SUPERVISION OF NORMAL FIRST PREGNANCY IN SECOND TRIMESTER: Primary | ICD-10-CM

## 2018-10-02 DIAGNOSIS — Z3A.23 23 WEEKS GESTATION OF PREGNANCY: ICD-10-CM

## 2018-10-02 PROCEDURE — 99999 PR PBB SHADOW E&M-EST. PATIENT-LVL II: CPT | Mod: PBBFAC,,, | Performed by: OBSTETRICS & GYNECOLOGY

## 2018-10-02 PROCEDURE — 99212 OFFICE O/P EST SF 10 MIN: CPT | Mod: PBBFAC,TH,PO | Performed by: OBSTETRICS & GYNECOLOGY

## 2018-10-02 PROCEDURE — 99213 OFFICE O/P EST LOW 20 MIN: CPT | Mod: TH,S$PBB,, | Performed by: OBSTETRICS & GYNECOLOGY

## 2018-10-02 NOTE — PROGRESS NOTES
Patient with no complaints. Denies vaginal bleeding or cramping.  Good FM. Discussed with patient having glucose testing for gestational diabetes preformed between 24-28 weeks. RTC in 4 weeks.     Patient is doing really well on Prozac 20 mg daily. She hasn't been able to follow up on counseling secondary to transportation issues. She reports still having diarrhea but no other side effects.     Coffective counseling sheet Learn Your Baby and Protect Breastfeeding discussed with mother. Instructed regarding feeding cues and methods to calm baby. Encouraged mother to download Mobuleective mobile jessica if she has not already done so.  Mother verbalized understanding.

## 2018-10-16 ENCOUNTER — TELEPHONE (OUTPATIENT)
Dept: OBSTETRICS AND GYNECOLOGY | Facility: CLINIC | Age: 23
End: 2018-10-16

## 2018-10-16 NOTE — TELEPHONE ENCOUNTER
25 wk ob states she is having lower stomach pain that feels like period cramps when she walks or sits up. States she has some relief when lying down. States she has been keeping hydrated. States she has been feeling dizzy at times. Denies bleeding, leaking of fluid or any other symptoms. Please advise.

## 2018-10-16 NOTE — TELEPHONE ENCOUNTER
----- Message from Candi Ballesteros sent at 10/16/2018  1:02 PM CDT -----  Contact: self  Wilmer Honeycutt  MRN: [id number]  Home Phone      [phone]  Work Phone      Not on file.  Mobile          [phone]    Patient Care Team:  Primary Doctor No as PCP - General  Tita Castillo MD (Obstetrics and Gynecology)  OB? Yes, 25w5d  What phone number can you be reached at? [phone]  Message:   Patient states she is having lower stomach pain that feels like period cramps and she denies bleeding. Please return call.

## 2018-10-17 NOTE — TELEPHONE ENCOUNTER
If the pain is in the bilateral lower abdomen, near her groin, that is normal and is round ligament pain. It sounds like this is probably what it is since it is only with movement.

## 2018-10-17 NOTE — TELEPHONE ENCOUNTER
Two pt identifiers verified.  Pt notified of Dr. Mary recommendations.  Pt admits pain /l in groin area.  Pt verbalized understanding.

## 2018-10-29 ENCOUNTER — TELEPHONE (OUTPATIENT)
Dept: OBSTETRICS AND GYNECOLOGY | Facility: CLINIC | Age: 23
End: 2018-10-29

## 2018-10-29 DIAGNOSIS — Z13.1 ENCOUNTER FOR SCREENING FOR DIABETES MELLITUS: Primary | ICD-10-CM

## 2018-11-09 ENCOUNTER — TELEPHONE (OUTPATIENT)
Dept: OBSTETRICS AND GYNECOLOGY | Facility: CLINIC | Age: 23
End: 2018-11-09

## 2018-11-09 RX ORDER — FERROUS SULFATE 325(65) MG
325 TABLET ORAL DAILY
Qty: 30 TABLET | Refills: 3 | Status: SHIPPED | OUTPATIENT
Start: 2018-11-09 | End: 2018-11-19 | Stop reason: SDUPTHER

## 2018-11-09 NOTE — TELEPHONE ENCOUNTER
----- Message from Shruthi Tobar MA sent at 11/9/2018 10:02 AM CST -----  Contact: self  Wilmer Honeycutt  MRN: [id number]  Home Phone      [phone]  Work Phone      Not on file.  Mobile          [phone]    Patient Care Team:  Primary Doctor No as PCP - General  OB? Yes, 29w1d  What phone number can you be reached at?  [phone]  Message:   Would like to see about getting Rx for Iron pills called in.  Pharmacy:  [facility]

## 2018-11-16 ENCOUNTER — ROUTINE PRENATAL (OUTPATIENT)
Dept: OBSTETRICS AND GYNECOLOGY | Facility: CLINIC | Age: 23
End: 2018-11-16
Payer: MEDICAID

## 2018-11-16 VITALS
BODY MASS INDEX: 35 KG/M2 | HEART RATE: 76 BPM | SYSTOLIC BLOOD PRESSURE: 122 MMHG | DIASTOLIC BLOOD PRESSURE: 66 MMHG | WEIGHT: 197.63 LBS

## 2018-11-16 DIAGNOSIS — Z34.03 ENCOUNTER FOR SUPERVISION OF NORMAL FIRST PREGNANCY IN THIRD TRIMESTER: Primary | ICD-10-CM

## 2018-11-16 DIAGNOSIS — Z3A.30 30 WEEKS GESTATION OF PREGNANCY: ICD-10-CM

## 2018-11-16 DIAGNOSIS — O99.343 DEPRESSION AFFECTING PREGNANCY IN THIRD TRIMESTER, ANTEPARTUM: ICD-10-CM

## 2018-11-16 DIAGNOSIS — F32.A DEPRESSION AFFECTING PREGNANCY IN THIRD TRIMESTER, ANTEPARTUM: ICD-10-CM

## 2018-11-16 DIAGNOSIS — Z23 NEED FOR VACCINATION: ICD-10-CM

## 2018-11-16 DIAGNOSIS — O99.210 OBESITY IN PREGNANCY: ICD-10-CM

## 2018-11-16 PROCEDURE — 99212 OFFICE O/P EST SF 10 MIN: CPT | Mod: PBBFAC,TH | Performed by: OBSTETRICS & GYNECOLOGY

## 2018-11-16 PROCEDURE — 90472 IMMUNIZATION ADMIN EACH ADD: CPT | Mod: PBBFAC

## 2018-11-16 PROCEDURE — 90471 IMMUNIZATION ADMIN: CPT | Mod: PBBFAC

## 2018-11-16 PROCEDURE — 99999 PR PBB SHADOW E&M-EST. PATIENT-LVL II: CPT | Mod: PBBFAC,,, | Performed by: OBSTETRICS & GYNECOLOGY

## 2018-11-16 PROCEDURE — 90715 TDAP VACCINE 7 YRS/> IM: CPT | Mod: PBBFAC

## 2018-11-16 PROCEDURE — 99213 OFFICE O/P EST LOW 20 MIN: CPT | Mod: TH,S$PBB,, | Performed by: OBSTETRICS & GYNECOLOGY

## 2018-11-16 NOTE — PROGRESS NOTES
Tdap administered IM to right deltoid as ordered. Seasonal Influenza Vaccination Questionnaire completed. VIS' given. Flu vaccine administered IM to left deltoid as ordered. Patient waited x 15 minutes with no reaction noted.

## 2018-11-16 NOTE — PROGRESS NOTES
Patient with no complaints. Denies vaginal bleeding or contractions. Good FM. Growth scan ordered for next visit. RTC in 2 weeks. Flu and tdap today.     Patient reports she is doing well on the Prozac 20 mg daily and denies any depression currently.     Coffective Motivation Document discussed with mother. Reinforced benefits of breastfeeding, risk of formula, and cue based feedings. Encouraged mother to download Coffective mobile jessica if she has not already done so. Mother verbalizes understanding.

## 2018-11-19 DIAGNOSIS — F32.A DEPRESSION AFFECTING PREGNANCY IN SECOND TRIMESTER, ANTEPARTUM: ICD-10-CM

## 2018-11-19 DIAGNOSIS — O99.342 DEPRESSION AFFECTING PREGNANCY IN SECOND TRIMESTER, ANTEPARTUM: ICD-10-CM

## 2018-11-19 RX ORDER — FLUOXETINE HYDROCHLORIDE 20 MG/1
20 CAPSULE ORAL DAILY
Qty: 30 CAPSULE | Refills: 11 | Status: SHIPPED | OUTPATIENT
Start: 2018-11-19 | End: 2019-01-30 | Stop reason: SDUPTHER

## 2018-11-19 RX ORDER — FERROUS SULFATE 325(65) MG
325 TABLET ORAL DAILY
Qty: 30 TABLET | Refills: 3 | Status: SHIPPED | OUTPATIENT
Start: 2018-11-19 | End: 2019-06-21

## 2018-11-19 NOTE — TELEPHONE ENCOUNTER
----- Message from Candi Ballesteros sent at 11/19/2018 12:01 PM CST -----  Contact: Self  Wilmer Honeycutt  MRN: [id number]  Home Phone      [phone]  Work Phone      Not on file.  Mobile          [phone]    Patient Care Team:  Primary Doctor No as PCP - General  OB? Yes, 30w4d  What phone number can you be reached at? [phone]  Message:   Patient states she is still having trouble getting her prescriptions transferred. Please return call.

## 2018-11-19 NOTE — TELEPHONE ENCOUNTER
Wilmer desire refill of Prozac and Iron. 30wk ob. Having trouble transferring from her other pharmacy, requesting new prescriptions be called into [facility] in [place]. Please advise.  Patient Active Problem List   Diagnosis    Chlamydia infection affecting pregnancy    Encounter for supervision of normal first pregnancy in third trimester    Depression affecting pregnancy in third trimester, antepartum     Prior to Admission medications    Medication Sig Start Date End Date Taking? Authorizing Provider   ferrous sulfate (FEOSOL) 325 mg (65 mg iron) Tab tablet Take 1 tablet (325 mg total) by mouth once daily. 11/9/18 11/9/19  Shaista Crhisty MD   FLUoxetine (PROZAC) 20 MG capsule Take 1 capsule (20 mg total) by mouth once daily. 9/18/18 9/18/19  Shaista Christy MD   ondansetron (ZOFRAN) 4 MG tablet Take 8 mg by mouth 2 (two) times daily.    Historical Provider, MD   PNV62-FA-om3-dha-epa-fish oil (PRENATAL GUMMY) 400 mcg-35 mg -25 mg-5 mg Chew Take by mouth.    Historical Provider, MD   promethazine (PHENERGAN) 25 MG tablet Take 25 mg by mouth every 4 (four) hours.    Historical Provider, MD

## 2018-11-25 ENCOUNTER — HOSPITAL ENCOUNTER (OUTPATIENT)
Facility: HOSPITAL | Age: 23
Discharge: HOME | End: 2018-11-25
Attending: OBSTETRICS & GYNECOLOGY | Admitting: OBSTETRICS & GYNECOLOGY
Payer: MEDICAID

## 2018-11-25 VITALS — BODY MASS INDEX: 35.02 KG/M2 | WEIGHT: 197.63 LBS | HEIGHT: 63 IN

## 2018-11-25 DIAGNOSIS — O46.93 VAGINAL BLEEDING IN PREGNANCY, THIRD TRIMESTER: ICD-10-CM

## 2018-11-25 DIAGNOSIS — O23.43 UTI (URINARY TRACT INFECTION) DURING PREGNANCY, THIRD TRIMESTER: ICD-10-CM

## 2018-11-25 DIAGNOSIS — O26.893 ABDOMINAL PAIN IN PREGNANCY, THIRD TRIMESTER: ICD-10-CM

## 2018-11-25 DIAGNOSIS — R10.9 ABDOMINAL PAIN DURING PREGNANCY, THIRD TRIMESTER: Primary | ICD-10-CM

## 2018-11-25 DIAGNOSIS — O26.893 ABDOMINAL PAIN DURING PREGNANCY, THIRD TRIMESTER: Primary | ICD-10-CM

## 2018-11-25 DIAGNOSIS — R10.9 ABDOMINAL PAIN IN PREGNANCY, THIRD TRIMESTER: ICD-10-CM

## 2018-11-25 LAB
AMPHET+METHAMPHET UR QL: NEGATIVE
BACTERIA #/AREA URNS HPF: ABNORMAL /HPF
BARBITURATES UR QL SCN>200 NG/ML: NEGATIVE
BENZODIAZ UR QL SCN>200 NG/ML: NEGATIVE
BILIRUB UR QL STRIP: NEGATIVE
BZE UR QL SCN: NEGATIVE
CANNABINOIDS UR QL SCN: NEGATIVE
CLARITY UR: ABNORMAL
COLOR UR: YELLOW
CREAT UR-MCNC: 67 MG/DL
GLUCOSE UR QL STRIP: ABNORMAL
HGB UR QL STRIP: NEGATIVE
KETONES UR QL STRIP: NEGATIVE
LEUKOCYTE ESTERASE UR QL STRIP: ABNORMAL
METHADONE UR QL SCN>300 NG/ML: NEGATIVE
MICROSCOPIC COMMENT: ABNORMAL
NITRITE UR QL STRIP: NEGATIVE
OPIATES UR QL SCN: NEGATIVE
PCP UR QL SCN>25 NG/ML: NEGATIVE
PH UR STRIP: 8 [PH] (ref 5–8)
PROT UR QL STRIP: ABNORMAL
RBC #/AREA URNS HPF: 1 /HPF (ref 0–4)
SP GR UR STRIP: 1.02 (ref 1–1.03)
SQUAMOUS #/AREA URNS HPF: 30 /HPF
TOXICOLOGY INFORMATION: NORMAL
URN SPEC COLLECT METH UR: ABNORMAL
UROBILINOGEN UR STRIP-ACNC: 1 EU/DL
WBC #/AREA URNS HPF: >100 /HPF (ref 0–5)

## 2018-11-25 PROCEDURE — 63600175 PHARM REV CODE 636 W HCPCS: Performed by: OBSTETRICS & GYNECOLOGY

## 2018-11-25 PROCEDURE — 59025 FETAL NON-STRESS TEST: CPT

## 2018-11-25 PROCEDURE — 96365 THER/PROPH/DIAG IV INF INIT: CPT | Performed by: OBSTETRICS & GYNECOLOGY

## 2018-11-25 PROCEDURE — G0378 HOSPITAL OBSERVATION PER HR: HCPCS

## 2018-11-25 PROCEDURE — 87086 URINE CULTURE/COLONY COUNT: CPT

## 2018-11-25 PROCEDURE — 25000003 PHARM REV CODE 250: Performed by: OBSTETRICS & GYNECOLOGY

## 2018-11-25 PROCEDURE — 99211 OFF/OP EST MAY X REQ PHY/QHP: CPT | Mod: TH,25

## 2018-11-25 PROCEDURE — 81000 URINALYSIS NONAUTO W/SCOPE: CPT | Mod: 59

## 2018-11-25 PROCEDURE — 80307 DRUG TEST PRSMV CHEM ANLYZR: CPT

## 2018-11-25 PROCEDURE — 99219 PR INITIAL OBSERVATION CARE,LEVL II: CPT | Mod: ,,, | Performed by: OBSTETRICS & GYNECOLOGY

## 2018-11-25 RX ORDER — ACETAMINOPHEN 500 MG
500 TABLET ORAL EVERY 6 HOURS PRN
Status: DISCONTINUED | OUTPATIENT
Start: 2018-11-25 | End: 2018-11-26 | Stop reason: HOSPADM

## 2018-11-25 RX ORDER — ONDANSETRON 4 MG/1
8 TABLET, ORALLY DISINTEGRATING ORAL EVERY 8 HOURS PRN
Status: DISCONTINUED | OUTPATIENT
Start: 2018-11-25 | End: 2018-11-26 | Stop reason: HOSPADM

## 2018-11-25 RX ORDER — NITROFURANTOIN 25; 75 MG/1; MG/1
100 CAPSULE ORAL 2 TIMES DAILY
Qty: 14 CAPSULE | Refills: 0 | Status: SHIPPED | OUTPATIENT
Start: 2018-11-25 | End: 2018-12-02

## 2018-11-25 RX ADMIN — CEFTRIAXONE 1 G: 1 INJECTION, SOLUTION INTRAVENOUS at 09:11

## 2018-11-25 RX ADMIN — SODIUM CHLORIDE, SODIUM LACTATE, POTASSIUM CHLORIDE, AND CALCIUM CHLORIDE 1000 ML: .6; .31; .03; .02 INJECTION, SOLUTION INTRAVENOUS at 09:11

## 2018-11-25 RX ADMIN — ACETAMINOPHEN 500 MG: 500 TABLET ORAL at 08:11

## 2018-11-26 NOTE — H&P
Labor and Delivery Triage H&P Note      Subjective:    Patient Info:  Wilmer Honeycutt         23 y.o.    female    1995     Patient presents at 31 and 3/7 weeks gestation with EDC 18.  She presents with complaints of one episode of vaginal spotting with wiping. Other associated symptoms include lower abdominal pain.  No contractions, no LOF. Fetal Movement: normal.      OB History:   OB History      Para Term  AB Living    1              SAB TAB Ectopic Multiple Live Births                         Estimated Date of Delivery: 19     Gestational Age:  31w3d     Past Medical History:  Past Medical History:   Diagnosis Date    Anemia         Past Surgical History:  History reviewed. No pertinent surgical history.    Social History:   Alcohol/Tobacco:  Social History     Tobacco Use    Smoking status: Current Every Day Smoker     Packs/day: 0.25     Years: 10.00     Pack years: 2.50     Types: Cigarettes    Smokeless tobacco: Never Used   Substance Use Topics    Alcohol use: Yes     Frequency: Never     Comment: occ    Drug use: No      Drug Use:  Social History     Substance and Sexual Activity   Drug Use No       Family History:  Family History   Problem Relation Age of Onset    Ovarian cancer Maternal Grandmother     Bladder Cancer Mother     Colon cancer Neg Hx     Breast cancer Neg Hx        Allergies:  Review of patient's allergies indicates:  No Known Allergies    Meds Prior to Arrival:  Prior to Admission medications    Medication Sig Start Date End Date Taking? Authorizing Provider   FLUoxetine (PROZAC) 20 MG capsule Take 1 capsule (20 mg total) by mouth once daily. 18 Yes Dominic Byrd MD   ondansetron (ZOFRAN) 4 MG tablet Take 8 mg by mouth 2 (two) times daily.   Yes Historical Provider, MD   PNV62-FA-om3-dha-epa-fish oil (PRENATAL GUMMY) 400 mcg-35 mg -25 mg-5 mg Chew Take by mouth.    "Yes Historical Provider, MD   promethazine (PHENERGAN) 25 MG tablet Take 25 mg by mouth every 4 (four) hours.   Yes Historical Provider, MD   ferrous sulfate (FEOSOL) 325 mg (65 mg iron) Tab tablet Take 1 tablet (325 mg total) by mouth once daily. 11/19/18 11/19/19  Dominic Byrd MD        Outpatient Medications Marked as Taking for the 11/25/18 encounter (Hospital Encounter)   Medication Sig Dispense Refill    FLUoxetine (PROZAC) 20 MG capsule Take 1 capsule (20 mg total) by mouth once daily. 30 capsule 11    ondansetron (ZOFRAN) 4 MG tablet Take 8 mg by mouth 2 (two) times daily.      PNV62-FA-om3-dha-epa-fish oil (PRENATAL GUMMY) 400 mcg-35 mg -25 mg-5 mg Chew Take by mouth.      promethazine (PHENERGAN) 25 MG tablet Take 25 mg by mouth every 4 (four) hours.         Review of Systems:  Constitutional: no fever or chills  Eyes: no visual changes  ENT: no nasal congestion or sore throat  Respiratory: no cough or shortness of breath  Cardiovascular: no chest pain or palpitations  Gastrointestinal: no nausea or vomiting, tolerating diet  Genitourinary: no hematuria or dysuria, positive for low abdominal/suprapubic pain  Integument/Breast: no rash or pruritis  Musculoskeletal: no arthralgias or myalgias  Neurological: no seizures or tremors  Behavioral/Psych: no auditory or visual hallucinations        Objective:    Recent Vitals:  Ht 5' 3" (1.6 m)   Wt 89.6 kg (197 lb 9.6 oz)   LMP 04/19/2018     Exam:    Cervix: 1.5/50/-5 (no change after 3 hours of monitoring.)    Lab Results:  Recent Results (from the past 36 hour(s))   Urinalysis, Reflex to Urine Culture Urine, Clean Catch    Collection Time: 11/25/18  7:00 PM   Result Value Ref Range    Specimen UA Urine, Clean Catch     Color, UA Yellow Yellow, Straw, Gabrielle    Appearance, UA Hazy (A) Clear    pH, UA 8.0 5.0 - 8.0    Specific Gravity, UA 1.020 1.005 - 1.030    Protein, UA Trace (A) Negative    Glucose, UA Trace (A) Negative    Ketones, UA Negative " Negative    Bilirubin (UA) Negative Negative    Occult Blood UA Negative Negative    Nitrite, UA Negative Negative    Urobilinogen, UA 1.0 <2.0 EU/dL    Leukocytes, UA 2+ (A) Negative   Urinalysis Microscopic    Collection Time: 11/25/18  7:00 PM   Result Value Ref Range    RBC, UA 1 0 - 4 /hpf    WBC, UA >100 (H) 0 - 5 /hpf    Bacteria, UA Many (A) None-Occ /hpf    Squam Epithel, UA 30 /hpf    Microscopic Comment SEE COMMENT    Drug screen panel, emergency    Collection Time: 11/25/18  7:58 PM   Result Value Ref Range    Benzodiazepines Negative     Methadone metabolites Negative     Cocaine (Metab.) Negative     Opiate Scrn, Ur Negative     Barbiturate Screen, Ur Negative     Amphetamine Screen, Ur Negative     THC Negative     Phencyclidine Negative     Creatinine, Random Ur 67.0 15.0 - 325.0 mg/dL    Toxicology Information SEE COMMENT      No results found for: ABORH       Diagnostic Studies:    Baseline: 135 bpm, Variability: Good {> 6 bpm), Accelerations: Reactive and Decelerations: Absent    [place]: no contractions      Assessment:    Dx:Abdominal pain in pregnancy, third trimester [O26.893, R10.9]  Active Hospital Problems    Diagnosis  POA    *Abdominal pain during pregnancy, third trimester [O26.893, R10.9]  Yes    Abdominal pain in pregnancy, third trimester [O26.893, R10.9]  Yes      Resolved Hospital Problems   No resolved problems to display.         Plan:    Admit to MD: Shaista Christy MD    Admit to: admitted to the hospital for observation    Code Status: Full Code       Diagnostic Plan/Orders:  Orders Placed This Encounter   Procedures    Urine culture    Urinalysis, Reflex to Urine Culture Urine, Clean Catch    Urinalysis Microscopic    Drug screen panel, emergency    Vital signs    Vital Signs (Specify Frequency)    Fetal monitoring    Continuous tocometry    Discharge Criteria    Notify clinical provider    Notify clinical provider    Full code    Obtain Fetal nonstress test  (NST)    Place in Observation        Scheduled Meds/IV Therapy:     cefTRIAXone (ROCEPHIN) IVPB 1 g Once   lactated ringers 1,000 mL Once             PRN Meds:    acetaminophen 500 mg Q6H PRN   ondansetron 8 mg Q8H PRN   promethazine (PHENERGAN) IVPB 12.5 mg Q6H PRN

## 2018-11-26 NOTE — DISCHARGE INSTRUCTIONS
Return to the labor unit or call ob nurse at hospital if:    1.  Any questions whether the water bag broke or is leaking (sudden gush or suspected leak).   2.  If 35 wks or greater, no need to call about passing the mucous plug if no other signs of labor.  3.  You may have spotting for a day or two from the vaginal exam  4.  Please report heavier vaginal bleeding (requires you to put a pad on or blood is running down your leg)  5. Report if you are having more than 6 contractions in an hour and less than 37 weeks, but at 37-38 wks. and beyond you can time your contractions and notify doctor if they are longer, stronger and closer together  6.  You should call if you are experiencing sharp abdominal pains or severe cramping.    7.  If you are > or = 28 wks, Do fetal kick counts 2 times a day  &  report decreased fetal movement:  You should feel 10 movements in 2 hours or less.  Notify MD or OB nurse as soon as possible if you are not getting the required movements or if  it is taking you longer and longer to get the 10 movements and DO NOT WAIT UNTIL TOMORROW EVEN IF YOU HAVE AN APPT.  8.  Drink plenty of water and empty your bladder often, avoid caffeine & carbonated beverages as much as possible & avoid smoking  9.  Keep your appt. as scheduled    Office phone # [phone]  If after office hours, on the weekend, or unable to reach the nurse at the office, call the Hospital phone # [phone] & ask to speak to the OB nurse

## 2018-11-26 NOTE — NURSING
Discharge instructions provided to patient and her sister. Both verbalized understanding. Instructed to  prescription and start medication in the morning. Patient ambulated off unit accompanied by sister in no apparent distress.

## 2018-11-26 NOTE — DISCHARGE SUMMARY
Discharge Note    SUMMARY     Admit Date: 11/25/2018    Attending Physician: Shaista Christy MD     Discharge Physician: Shaista Christy MD    Discharge Date: 11/25/2018     Admit Diagnosis:  IUP @ 31 3/7 EGA  Vaginal spotting in pregnancy  Abdominal pain in pregnancy    Final Diagnosis:   1. Abdominal pain during pregnancy, third trimester    2. Abdominal pain in pregnancy, third trimester    3. Vaginal bleeding in pregnancy, third trimester    4. UTI (urinary tract infection) during pregnancy, third trimester          Procedure: NST    Disposition: Home or Self Care    Condition: Stable    Hospital Course: Pt admitted for observation to evaluate vaginal spotting and lower abdominal pain.  Pt diagnosed with UTI and treated with Rocephin. No cervical change after 3 hours.  No contractions on toco. NST reactive and reassuring.    Patient Instructions:   Current Discharge Medication List      START taking these medications    Details   nitrofurantoin, macrocrystal-monohydrate, (MACROBID) 100 MG capsule Take 1 capsule (100 mg total) by mouth 2 (two) times daily. for 7 days  Qty: 14 capsule, Refills: 0         CONTINUE these medications which have NOT CHANGED    Details   FLUoxetine (PROZAC) 20 MG capsule Take 1 capsule (20 mg total) by mouth once daily.  Qty: 30 capsule, Refills: 11    Associated Diagnoses: Depression affecting pregnancy in second trimester, antepartum      ondansetron (ZOFRAN) 4 MG tablet Take 8 mg by mouth 2 (two) times daily.      PNV62-FA-om3-dha-epa-fish oil (PRENATAL GUMMY) 400 mcg-35 mg -25 mg-5 mg Chew Take by mouth.      promethazine (PHENERGAN) 25 MG tablet Take 25 mg by mouth every 4 (four) hours.      ferrous sulfate (FEOSOL) 325 mg (65 mg iron) Tab tablet Take 1 tablet (325 mg total) by mouth once daily.  Qty: 30 tablet, Refills: 3             Discharge Procedure Orders (must include Diet, Follow-up, Activity)   Discharge Procedure Orders (must include Diet, Follow-up, Activity)    Call MD for:  temperature >100.4     Call MD for:  persistent nausea and vomiting or diarrhea     Call MD for:  severe uncontrolled pain     Call MD for:  redness, tenderness, or signs of infection (pain, swelling, redness, odor or green/yellow discharge around incision site)     Call MD for:  difficulty breathing or increased cough     Call MD for:  severe persistent headache     Call MD for:  worsening rash     Call MD for:  persistent dizziness, light-headedness, or visual disturbances     Call MD for:  increased confusion or weakness     Call MD for:   Order Comments: Obstetric patients: Call for decreased fetal movement, regular uterine contractions, leakage of fluid, vaginal bleeding or any other concerns  Gynecology patients: Call for incisional drainage, redness, or tenderness, abnormal vaginal bleeding or discharge or any other concerns      No dressing needed        Follow-up Information     Tita Castillo MD On 11/30/2018.    Specialty:  Obstetrics and Gynecology  Why:  routinely scheduled OB visit  Contact information:  [address] [address]  [phone]

## 2018-11-26 NOTE — PROCEDURES
FETAL ASSESSMENT REPORT    RE: Wilmer Honeycutt  MRN:  [id number]  :  1995  AGE:  23 y.o.    Date:  2018    REFERRAL PHYSICIAN: Dr. Shaista Christy    Allergies: Patient has no known allergies.    Wilmer is a 23 y.o.  at 31w3d gestational age here today for a NST.    2019, by Last Menstrual Period    MEDICATIONS AT TIME OF TEST:  Current Facility-Administered Medications   Medication Dose Route Frequency Provider Last Rate Last Dose    acetaminophen tablet 500 mg  500 mg Oral Q6H PRN Shaista Christy MD   500 mg at 18    cefTRIAXone (ROCEPHIN) 1 g in dextrose 5 % 50 mL IVPB  1 g Intravenous Once Shaista Christy MD        lactated ringers bolus 1,000 mL  1,000 mL Intravenous Once Shaista Christy MD        ondansetron disintegrating tablet 8 mg  8 mg Oral Q8H PRN Shaista Christy MD        promethazine (PHENERGAN) 12.5 mg in dextrose 5 % 50 mL IVPB  12.5 mg Intravenous Q6H PRN Shaista Christy MD           Indication: vaginal bleeding and rule out uterine contractions    Interpretation:  135 BPM baseline    Variability:  Good {> 6 bpm)    Accelerations:  Reactive    Decelerations:  none    [place]: no contractions    Assessment: reactive    Plan:  NST reactive      Shaista Christy MD  2018; 8:59 PM

## 2018-11-27 LAB — BACTERIA UR CULT: NORMAL

## 2018-11-30 ENCOUNTER — PROCEDURE VISIT (OUTPATIENT)
Dept: OBSTETRICS AND GYNECOLOGY | Facility: CLINIC | Age: 23
End: 2018-11-30
Payer: MEDICAID

## 2018-11-30 ENCOUNTER — ROUTINE PRENATAL (OUTPATIENT)
Dept: OBSTETRICS AND GYNECOLOGY | Facility: CLINIC | Age: 23
End: 2018-11-30
Payer: MEDICAID

## 2018-11-30 VITALS
HEART RATE: 80 BPM | BODY MASS INDEX: 35.43 KG/M2 | SYSTOLIC BLOOD PRESSURE: 120 MMHG | WEIGHT: 200 LBS | DIASTOLIC BLOOD PRESSURE: 78 MMHG

## 2018-11-30 DIAGNOSIS — F32.A DEPRESSION AFFECTING PREGNANCY IN THIRD TRIMESTER, ANTEPARTUM: ICD-10-CM

## 2018-11-30 DIAGNOSIS — O99.210 OBESITY IN PREGNANCY: ICD-10-CM

## 2018-11-30 DIAGNOSIS — O99.343 DEPRESSION AFFECTING PREGNANCY IN THIRD TRIMESTER, ANTEPARTUM: ICD-10-CM

## 2018-11-30 DIAGNOSIS — Z34.03 ENCOUNTER FOR SUPERVISION OF NORMAL FIRST PREGNANCY IN THIRD TRIMESTER: Primary | ICD-10-CM

## 2018-11-30 DIAGNOSIS — Z3A.32 32 WEEKS GESTATION OF PREGNANCY: ICD-10-CM

## 2018-11-30 PROBLEM — R10.9 ABDOMINAL PAIN DURING PREGNANCY, THIRD TRIMESTER: Status: RESOLVED | Noted: 2018-11-25 | Resolved: 2018-11-30

## 2018-11-30 PROBLEM — O26.893 ABDOMINAL PAIN DURING PREGNANCY, THIRD TRIMESTER: Status: RESOLVED | Noted: 2018-11-25 | Resolved: 2018-11-30

## 2018-11-30 PROBLEM — O26.893 ABDOMINAL PAIN IN PREGNANCY, THIRD TRIMESTER: Status: RESOLVED | Noted: 2018-11-25 | Resolved: 2018-11-30

## 2018-11-30 PROBLEM — O46.93 VAGINAL BLEEDING IN PREGNANCY, THIRD TRIMESTER: Status: RESOLVED | Noted: 2018-11-25 | Resolved: 2018-11-30

## 2018-11-30 PROBLEM — R10.9 ABDOMINAL PAIN IN PREGNANCY, THIRD TRIMESTER: Status: RESOLVED | Noted: 2018-11-25 | Resolved: 2018-11-30

## 2018-11-30 PROCEDURE — 99999 PR PBB SHADOW E&M-EST. PATIENT-LVL II: CPT | Mod: PBBFAC,,, | Performed by: OBSTETRICS & GYNECOLOGY

## 2018-11-30 PROCEDURE — 76816 OB US FOLLOW-UP PER FETUS: CPT | Mod: 26,S$PBB,, | Performed by: OBSTETRICS & GYNECOLOGY

## 2018-11-30 PROCEDURE — 76816 OB US FOLLOW-UP PER FETUS: CPT | Mod: PBBFAC | Performed by: OBSTETRICS & GYNECOLOGY

## 2018-11-30 PROCEDURE — 99212 OFFICE O/P EST SF 10 MIN: CPT | Mod: PBBFAC,TH | Performed by: OBSTETRICS & GYNECOLOGY

## 2018-11-30 PROCEDURE — 99213 OFFICE O/P EST LOW 20 MIN: CPT | Mod: TH,S$PBB,, | Performed by: OBSTETRICS & GYNECOLOGY

## 2018-11-30 NOTE — PROGRESS NOTES
Patient with no complaints. Denies vaginal bleeding or contractions. Good FM. Growth scan reviewed. Fetal weight 4 pounds 2 ounces; 26%. MVP 5.5cm. Posterior placenta. Vertex.     Patient seen ~ 5 days ago on L&D; dx'ed with UTI and is on antibiotics. She reports she was dilated ~ 1.5cm at that visit. She is no longer having abdominal pain or contractions.     Coffective counseling sheet Build Your Team discussed with mother. Reinforced importance of early identification of support team including champion, OB provider, pediatrician and local community resources. Encouraged mother to download Coffective mobile jessica if she has not already done so.  Mother verbalizes understanding. Also discussed quiet time and delayed bathing.

## 2018-12-07 ENCOUNTER — TELEPHONE (OUTPATIENT)
Dept: OBSTETRICS AND GYNECOLOGY | Facility: CLINIC | Age: 23
End: 2018-12-07

## 2018-12-07 DIAGNOSIS — O22.43 HEMORRHOIDS DURING PREGNANCY IN THIRD TRIMESTER: Primary | ICD-10-CM

## 2018-12-07 NOTE — TELEPHONE ENCOUNTER
Rx for proctofoam HC sent to patient's pharmacy.  She should be sure to increase water and fiber intake as well.

## 2018-12-07 NOTE — TELEPHONE ENCOUNTER
----- Message from Candi Ballesteros sent at 12/7/2018 12:19 PM CST -----  Contact: Self  Wilmer Honeycutt  MRN: [id number]  Home Phone      [phone]  Work Phone      Not on file.  Mobile          [phone]    Patient Care Team:  Primary Doctor No as PCP - General  OB? Yes, 33w1d  What phone number can you be reached at? [phone]  Message:   Patient states that she has a hemorrhoid that it is now bleeding. States she has tried everything she knows to try with no relief. Please return call.

## 2018-12-07 NOTE — TELEPHONE ENCOUNTER
33 week states she has a external hemorrhoid. States has had no relief with OTC medication. Requesting a prescription medication. Please advise.

## 2018-12-09 ENCOUNTER — HOSPITAL ENCOUNTER (OUTPATIENT)
Facility: HOSPITAL | Age: 23
Discharge: HOME | End: 2018-12-09
Attending: OBSTETRICS & GYNECOLOGY | Admitting: OBSTETRICS & GYNECOLOGY
Payer: MEDICAID

## 2018-12-09 VITALS
SYSTOLIC BLOOD PRESSURE: 138 MMHG | HEIGHT: 63 IN | TEMPERATURE: 98 F | BODY MASS INDEX: 35.44 KG/M2 | RESPIRATION RATE: 18 BRPM | OXYGEN SATURATION: 97 % | HEART RATE: 92 BPM | WEIGHT: 200 LBS | DIASTOLIC BLOOD PRESSURE: 60 MMHG

## 2018-12-09 DIAGNOSIS — Z91.81 PERSONAL HISTORY OF FALL, PRESENTING HAZARDS TO HEALTH: ICD-10-CM

## 2018-12-09 DIAGNOSIS — O36.8131 DECREASED FETAL MOVEMENT, THIRD TRIMESTER, FETUS 1: ICD-10-CM

## 2018-12-09 PROBLEM — M79.644 PAIN OF RIGHT THUMB: Status: ACTIVE | Noted: 2018-12-09

## 2018-12-09 LAB
APTT BLDCRRT: 27.2 SEC
BASOPHILS # BLD AUTO: 0.03 K/UL
BASOPHILS NFR BLD: 0.2 %
DIFFERENTIAL METHOD: ABNORMAL
EOSINOPHIL # BLD AUTO: 0.2 K/UL
EOSINOPHIL NFR BLD: 1.2 %
ERYTHROCYTE [DISTWIDTH] IN BLOOD BY AUTOMATED COUNT: 12.2 %
HCT VFR BLD AUTO: 30.8 %
HGB BLD-MCNC: 10.4 G/DL
INR PPP: 1
LYMPHOCYTES # BLD AUTO: 3.1 K/UL
LYMPHOCYTES NFR BLD: 20.6 %
MCH RBC QN AUTO: 31.4 PG
MCHC RBC AUTO-ENTMCNC: 33.8 G/DL
MCV RBC AUTO: 93 FL
MONOCYTES # BLD AUTO: 1 K/UL
MONOCYTES NFR BLD: 6.5 %
NEUTROPHILS # BLD AUTO: 10.8 K/UL
NEUTROPHILS NFR BLD: 71.5 %
PLATELET # BLD AUTO: 290 K/UL
PMV BLD AUTO: 11.3 FL
PROTHROMBIN TIME: 10.1 SEC
RBC # BLD AUTO: 3.31 M/UL
WBC # BLD AUTO: 15.15 K/UL

## 2018-12-09 PROCEDURE — 99211 OFF/OP EST MAY X REQ PHY/QHP: CPT | Mod: 25,TH

## 2018-12-09 PROCEDURE — 99220 PR INITIAL OBSERVATION CARE,LEVL III: CPT | Mod: ,,, | Performed by: OBSTETRICS & GYNECOLOGY

## 2018-12-09 PROCEDURE — 36415 COLL VENOUS BLD VENIPUNCTURE: CPT

## 2018-12-09 PROCEDURE — 85384 FIBRINOGEN ACTIVITY: CPT

## 2018-12-09 PROCEDURE — G0378 HOSPITAL OBSERVATION PER HR: HCPCS

## 2018-12-09 PROCEDURE — 85610 PROTHROMBIN TIME: CPT

## 2018-12-09 PROCEDURE — 85025 COMPLETE CBC W/AUTO DIFF WBC: CPT

## 2018-12-09 PROCEDURE — 85730 THROMBOPLASTIN TIME PARTIAL: CPT

## 2018-12-09 PROCEDURE — 59025 FETAL NON-STRESS TEST: CPT | Mod: 59

## 2018-12-09 RX ORDER — HYDROCORTISONE ACETATE 25 MG/1
25 SUPPOSITORY RECTAL 2 TIMES DAILY PRN
Qty: 14 SUPPOSITORY | Refills: 1 | Status: SHIPPED | OUTPATIENT
Start: 2018-12-09 | End: 2018-12-16

## 2018-12-09 RX ORDER — ONDANSETRON 4 MG/1
8 TABLET, ORALLY DISINTEGRATING ORAL EVERY 8 HOURS PRN
Status: DISCONTINUED | OUTPATIENT
Start: 2018-12-09 | End: 2018-12-09 | Stop reason: HOSPADM

## 2018-12-09 RX ORDER — ACETAMINOPHEN 500 MG
500 TABLET ORAL EVERY 6 HOURS PRN
Status: DISCONTINUED | OUTPATIENT
Start: 2018-12-09 | End: 2018-12-09 | Stop reason: HOSPADM

## 2018-12-09 NOTE — H&P
History and Physical  Obstetrics      SUBJECTIVE:     Wilmer Honeycutt is a 23 y.o.  female  at 33w3d weeks gestation with an Estimated Date of Delivery: 19 who presents complaining of decreased fetal movement.  States she fell onto her right thumb and buttocks during a fall at 1 pm today.  Since then, she states FM has been decreased.  She denies vaginal bleeding, contractions, pelvic pain, and leakage of fluid.  She reports pain of right proximal thumb.  States she took two extra strength tylenol with no relief of the thumb pain.    She has been followed prenatally with the following prenatal complications:   Active Hospital Problems    Diagnosis    *Decreased fetal movement, third trimester, fetus 1    Personal history of fall, presenting hazards to health    Pain of right thumb         HISTORY:     Prior to Admission medications    Medication Sig Start Date End Date Taking? Authorizing Provider   ferrous sulfate (FEOSOL) 325 mg (65 mg iron) Tab tablet Take 1 tablet (325 mg total) by mouth once daily. 18  Dominic Byrd MD   FLUoxetine (PROZAC) 20 MG capsule Take 1 capsule (20 mg total) by mouth once daily. 18  Dominic Byrd MD   hydrocortisone-pramoxine (PROCTOFOAM HC) rectal foam Place 1 applicator rectally 2 (two) times daily. 18   Jennifer Finch MD   ondansetron (ZOFRAN) 4 MG tablet Take 8 mg by mouth 2 (two) times daily.    Historical Provider, MD   PNV62-FA-om3-dha-epa-fish oil (PRENATAL GUMMY) 400 mcg-35 mg -25 mg-5 mg Chew Take by mouth.    Historical Provider, MD   promethazine (PHENERGAN) 25 MG tablet Take 25 mg by mouth every 4 (four) hours.    Historical Provider, MD      Past Medical History:   Diagnosis Date    Anemia      No past surgical history on file.  Family History   Problem Relation Age of Onset    Ovarian cancer Maternal Grandmother     Bladder Cancer Mother     Colon cancer Neg Hx     Breast cancer Neg Hx      Social History      Tobacco Use    Smoking status: Current Every Day Smoker     Packs/day: 0.25     Years: 10.00     Pack years: 2.50     Types: Cigarettes    Smokeless tobacco: Never Used   Substance Use Topics    Alcohol use: Yes     Frequency: Never     Comment: occ    Drug use: No     OB History    Para Term  AB Living   1             SAB TAB Ectopic Multiple Live Births                  # Outcome Date GA Lbr Fish/2nd Weight Sex Delivery Anes PTL Lv   1 Current                   Allergy:   Review of patient's allergies indicates:  No Known Allergies       Review of Systems   All other systems reviewed and are negative.        OBJECTIVE:     Initial Vitals   BP Pulse Resp Temp SpO2   -- -- -- -- --      MAP       --         LMP 2018         Physical Exam:  General:  alert,normal appearing gravid female   Skin:  Skin color, texture, turgor normal. No rashes or lesions   HEENT:  conjunctivae/corneas clear. THERESA   Lungs:  clear to auscultation bilaterally   Heart:  regular rate and rhythm, S1, S2 normal, no murmur, click, rub or gallop   Breasts:   Nipples are protruding and have no nipple discharge. No palpable masses, erythema, skin changes, tenderness, or adenopathy.   Abdomen:  soft, non-tender; bowel sounds normal   Uterine Size:  consistent with dates   Presentations:  cephalic   FHT: 125 BPM; + reactive  [place]:  Occasional contractions   Pelvis: External genitalia: normal external genitalia without lesions  Vaginal: without lesions or discharge   Cervix:     Dilation: 1 cm    Effacement: thick    Station:  -5    Consistency: medium    Position: vertex       OB Lab History:     Group & Rh   Date Value Ref Range Status   2018 A POS  Final     Indirect Alecia   Date Value Ref Range Status   2018 NEG  Final     Lab Results   Component Value Date    WBC 9.73 2018    HGB 10.6 (L) 2018    HCT 32.0 (L) 2018    MCV 96 2018     2018     No results found for:  TSH  Lab Results   Component Value Date    RUBELLAIGGAN 19.0 05/29/2018     Lab Results   Component Value Date    RPR Non-reactive 05/29/2018     HIV 1/2 Ag/Ab   Date Value Ref Range Status   05/29/2018 Negative Negative Final     Hepatitis B Surface Ag   Date Value Ref Range Status   05/29/2018 Negative  Final     Results for orders placed or performed in visit on 11/07/18   OB Glucose Screen   Result Value Ref Range    OB Glucose Screen 112 70 - 140 mg/dL     No results found for this or any previous visit.  No results found for this or any previous visit.  Results for orders placed or performed in visit on 08/02/18   C. trachomatis/N. gonorrhoeae by AMP DNA   Result Value Ref Range    Chlamydia, Amplified DNA Not Detected Not Detected    N gonorrhoeae, amplified DNA Not Detected Not Detected       ASSESSMENT/PLAN:     IUP 33w3d gestation  Decreased fetal movement, third trimester  S/P fall presenting hazards to health    Patient Active Problem List   Diagnosis    Chlamydia infection affecting pregnancy    Encounter for supervision of normal first pregnancy in third trimester    Depression affecting pregnancy in third trimester, antepartum    UTI (urinary tract infection) during pregnancy, third trimester    Personal history of fall, presenting hazards to health    Pain of right thumb    Decreased fetal movement, third trimester, fetus 1         PLAN:   1.  Place on observation on L&D.  2.  NST reactive.  Will continue to monitor FM. BPP ordered.  3.  Labs ordered (fibrinogen, PT/INR, PTT, and CBC).  Will follow these labs and repeat as necessary.  4.  Right thumb x-ray ordered.

## 2018-12-10 ENCOUNTER — TELEPHONE (OUTPATIENT)
Dept: OBSTETRICS AND GYNECOLOGY | Facility: CLINIC | Age: 23
End: 2018-12-10

## 2018-12-10 LAB — FIBRINOGEN PPP-MCNC: NORMAL MG/DL

## 2018-12-10 NOTE — NURSING
The  from [facility] called to report a fibrinogen greater than 700. Notified Dr. Finch, no new orders.

## 2018-12-10 NOTE — DISCHARGE INSTRUCTIONS
Return to the labor unit or call ob nurse at hospital if:    1.  Any questions whether the water bag broke or is leaking (sudden gush or suspected leak).   2.  If 35 wks or greater, no need to call about passing the mucous plug if no other signs of labor.  3.  You may have spotting for a day or two from the vaginal exam  4.  Please report heavier vaginal bleeding (requires you to put a pad on or blood is running down your leg)  5. Report if you are having more than 6 contractions in an hour and less than 37 weeks, but at 37-38 wks. and beyond you can time your contractions and notify doctor if they are longer, stronger and closer together  6.  You should call if you are experiencing sharp abdominal pains or severe cramping.    7.  If you are > or = 28 wks, Do fetal kick counts 2 times a day  &  report decreased fetal movement:  You should feel 10 movements in 2 hours or less.  Notify MD or OB nurse as soon as possible if you are not getting the required movements or if  it is taking you longer and longer to get the 10 movements and DO NOT WAIT UNTIL TOMORROW EVEN IF YOU HAVE AN APPT.  8.  Drink plenty of water and empty your bladder often, avoid caffeine & carbonated beverages as much as possible & avoid smoking  9.  Keep your appt. as scheduled    Office phone # [phone]  If after office hours, on the weekend, or unable to reach the nurse at the office, call the Hospital phone # [phone] & ask to speak to the OB nurse

## 2018-12-10 NOTE — TELEPHONE ENCOUNTER
Pharmacist Naif states Proctofoam and Anusol HC are not covered. Pharmacist states Proctozone cream 2.5mg is covered at $3. Please advise.

## 2018-12-10 NOTE — TELEPHONE ENCOUNTER
Please call in Proctozone cream 2.5 mg for patient.  It is to be applied to affected area twice daily.  Dispense #30 grams with no refills.  In Epic, when proctozone is entered, Anusol HC populates in (which was not covered).

## 2018-12-10 NOTE — PROCEDURES
FETAL ASSESSMENT REPORT    RE: Wilmer Honeycutt  MRN:  [id number]  :  1995  AGE:  23 y.o.    Date:  2018    REFERRAL PHYSICIAN: Dr.Elena Finch    Allergies: Patient has no known allergies.    Wilmer is a 23 y.o.  at 33w3d gestational age here today for a NST secondary to decreased fetal movement.    2019, by Last Menstrual Period    MEDICATIONS AT TIME OF TEST:  Current Facility-Administered Medications   Medication Dose Route Frequency Provider Last Rate Last Dose    acetaminophen tablet 500 mg  500 mg Oral Q6H PRN Jennifer Finch MD        ondansetron disintegrating tablet 8 mg  8 mg Oral Q8H PRN Jennifer Finch MD        promethazine (PHENERGAN) 12.5 mg in dextrose 5 % 50 mL IVPB  12.5 mg Intravenous Q6H PRN Jennifer Finch MD           Indication: decreased fetal movement    Interpretation:  125 BPM baseline    Variability:  Good {> 6 bpm)    Accelerations:  Reactive    Decelerations:  none    Assessment: reactive    Plan:  NST reactive, biophysical profile ordered      Jennifer Finch MD  2018; 6:12 PM

## 2018-12-10 NOTE — TELEPHONE ENCOUNTER
----- Message from Beryl Tobar sent at 12/10/2018 11:35 AM CST -----  Contact: self  Wilmer Honeycutt  MRN: [id number]  Home Phone      [phone]  Work Phone      Not on file.  Mobile          [phone]    Patient Care Team:  Primary Doctor No as PCP - General  OB? Yes, 33w4d  What phone number can you be reached at? [phone]  Message:  Pt is stating that the medication that was called in for her hemorrhoids is not covered by her insurance she needs another medication called in. Please return call.

## 2018-12-10 NOTE — NURSING
Patient discharged home at 2017 with her sister in stable condition.  Instructions given to F/U with Dr. Mary for her scheduled appointment on Friday.  Education given for fall prevention, hemorrhoid, common pregnancy questions, and adapting to third trimester.

## 2018-12-10 NOTE — TELEPHONE ENCOUNTER
----- Message from Candi Ballesteros sent at 12/10/2018 10:11 AM CST -----  Contact: self  Wilmer Honeycutt  MRN: [id number]  Home Phone      [phone]  Work Phone      Not on file.  Mobile          [phone]    Patient Care Team:  Primary Doctor No as PCP - General  OB? Yes, 33w4d  What phone number can you be reached at? [phone]  Message:   Patient states she needs a PA on her hemorrhoid cream. Please return call.

## 2018-12-10 NOTE — DISCHARGE SUMMARY
Discharge Note      SUMMARY     Admit Date: 12/9/2018    Attending Physician: Tita Mary MD     Discharge Physician: Jennifer Finch MD    Discharge Date: 12/9/2018     Admit Diagnosis:  IUP at 33 3/7 weeks; Decreased fetal movement in third trimester; S/P fall presenting hazards to health; Right thumb pain    Final Diagnosis:  IUP at 33 3/7 weeks; Decreased fetal movement in third trimester; S/P fall presenting hazards to health; Right thumb pain    Procedure: NST; BPP; Right thumb x-ray    Disposition: Home or Self Care    Condition: Stable    Hospital Course: Pt presented at 33 3/7 weeks complaining of decreased fetal movement and right thumb pain after fall.  Labs were as expected for pregnancy.  NST was reactive.  BPP 8/8.  Right thumb x-ray was negative.  She was discharged home in good condition.    Patient Instructions:   Current Discharge Medication List      CONTINUE these medications which have NOT CHANGED    Details   ferrous sulfate (FEOSOL) 325 mg (65 mg iron) Tab tablet Take 1 tablet (325 mg total) by mouth once daily.  Qty: 30 tablet, Refills: 3      FLUoxetine (PROZAC) 20 MG capsule Take 1 capsule (20 mg total) by mouth once daily.  Qty: 30 capsule, Refills: 11    Associated Diagnoses: Depression affecting pregnancy in second trimester, antepartum      hydrocortisone (ANUSOL-HC) 25 mg suppository Place 1 suppository (25 mg total) rectally 2 (two) times daily as needed for Hemorrhoids.  Qty: 14 suppository, Refills: 1      hydrocortisone-pramoxine (PROCTOFOAM HC) rectal foam Place 1 applicator rectally 2 (two) times daily.  Qty: 10 g, Refills: 0    Associated Diagnoses: Hemorrhoids during pregnancy in third trimester      ondansetron (ZOFRAN) 4 MG tablet Take 8 mg by mouth 2 (two) times daily.      PNV62-FA-om3-dha-epa-fish oil (PRENATAL GUMMY) 400 mcg-35 mg -25 mg-5 mg Chew Take by mouth.      promethazine (PHENERGAN) 25 MG tablet Take 25 mg by mouth every 4 (four) hours.             Discharge  Procedure Orders (must include Diet, Follow-up, Activity)  No discharge procedures on file.     Diet:  Regular    Activity:  As tolerated    Follow-up Information     Jennifer Finch MD On 12/14/2018.    Specialty:  Obstetrics  Contact information:  [address] [place] [id number] [phone]

## 2018-12-10 NOTE — NURSING
Notified Dr. Finch of fibrinogen results not available and the lab is having issues, the test must be sent to [facility]. Dr. Finch said it is okay to discharge the patient without results.

## 2018-12-14 ENCOUNTER — ROUTINE PRENATAL (OUTPATIENT)
Dept: OBSTETRICS AND GYNECOLOGY | Facility: CLINIC | Age: 23
End: 2018-12-14
Payer: MEDICAID

## 2018-12-14 VITALS
SYSTOLIC BLOOD PRESSURE: 120 MMHG | WEIGHT: 198 LBS | HEART RATE: 80 BPM | BODY MASS INDEX: 35.07 KG/M2 | DIASTOLIC BLOOD PRESSURE: 80 MMHG

## 2018-12-14 DIAGNOSIS — O99.343 DEPRESSION AFFECTING PREGNANCY IN THIRD TRIMESTER, ANTEPARTUM: ICD-10-CM

## 2018-12-14 DIAGNOSIS — Z3A.34 34 WEEKS GESTATION OF PREGNANCY: ICD-10-CM

## 2018-12-14 DIAGNOSIS — F32.A DEPRESSION AFFECTING PREGNANCY IN THIRD TRIMESTER, ANTEPARTUM: ICD-10-CM

## 2018-12-14 DIAGNOSIS — Z34.03 ENCOUNTER FOR SUPERVISION OF NORMAL FIRST PREGNANCY IN THIRD TRIMESTER: Primary | ICD-10-CM

## 2018-12-14 PROCEDURE — 99213 OFFICE O/P EST LOW 20 MIN: CPT | Mod: TH,S$PBB,, | Performed by: OBSTETRICS & GYNECOLOGY

## 2018-12-14 PROCEDURE — 99999 PR PBB SHADOW E&M-EST. PATIENT-LVL III: CPT | Mod: PBBFAC,,, | Performed by: OBSTETRICS & GYNECOLOGY

## 2018-12-14 PROCEDURE — 99213 OFFICE O/P EST LOW 20 MIN: CPT | Mod: PBBFAC,TH | Performed by: OBSTETRICS & GYNECOLOGY

## 2018-12-27 ENCOUNTER — ROUTINE PRENATAL (OUTPATIENT)
Dept: OBSTETRICS AND GYNECOLOGY | Facility: CLINIC | Age: 23
End: 2018-12-27
Payer: MEDICAID

## 2018-12-27 ENCOUNTER — TELEPHONE (OUTPATIENT)
Dept: OBSTETRICS AND GYNECOLOGY | Facility: CLINIC | Age: 23
End: 2018-12-27

## 2018-12-27 VITALS
HEART RATE: 80 BPM | DIASTOLIC BLOOD PRESSURE: 88 MMHG | SYSTOLIC BLOOD PRESSURE: 128 MMHG | WEIGHT: 200 LBS | BODY MASS INDEX: 35.43 KG/M2

## 2018-12-27 DIAGNOSIS — O99.343 DEPRESSION AFFECTING PREGNANCY IN THIRD TRIMESTER, ANTEPARTUM: ICD-10-CM

## 2018-12-27 DIAGNOSIS — Z34.03 ENCOUNTER FOR SUPERVISION OF NORMAL FIRST PREGNANCY IN THIRD TRIMESTER: Primary | ICD-10-CM

## 2018-12-27 DIAGNOSIS — F32.A DEPRESSION AFFECTING PREGNANCY IN THIRD TRIMESTER, ANTEPARTUM: ICD-10-CM

## 2018-12-27 DIAGNOSIS — Z3A.36 36 WEEKS GESTATION OF PREGNANCY: ICD-10-CM

## 2018-12-27 PROBLEM — M79.644 PAIN OF RIGHT THUMB: Status: RESOLVED | Noted: 2018-12-09 | Resolved: 2018-12-27

## 2018-12-27 PROBLEM — O36.8131 DECREASED FETAL MOVEMENT, THIRD TRIMESTER, FETUS 1: Status: RESOLVED | Noted: 2018-12-09 | Resolved: 2018-12-27

## 2018-12-27 PROCEDURE — 99999 PR PBB SHADOW E&M-EST. PATIENT-LVL II: CPT | Mod: PBBFAC,,, | Performed by: OBSTETRICS & GYNECOLOGY

## 2018-12-27 PROCEDURE — 99212 OFFICE O/P EST SF 10 MIN: CPT | Mod: PBBFAC,TH | Performed by: OBSTETRICS & GYNECOLOGY

## 2018-12-27 PROCEDURE — 87081 CULTURE SCREEN ONLY: CPT

## 2018-12-27 PROCEDURE — 99213 OFFICE O/P EST LOW 20 MIN: CPT | Mod: TH,S$PBB,, | Performed by: OBSTETRICS & GYNECOLOGY

## 2018-12-27 RX ORDER — METRONIDAZOLE 500 MG/1
500 TABLET ORAL 3 TIMES DAILY
Qty: 21 TABLET | Refills: 0 | Status: ON HOLD | OUTPATIENT
Start: 2018-12-27 | End: 2019-01-04 | Stop reason: HOSPADM

## 2018-12-27 NOTE — TELEPHONE ENCOUNTER
This message was sent from Candi Ballesteros to Rodri Garcia and Andrea Cordon    Good Afternoon,               We had a incident occur that started about 3 weeks ago with a family member of Patient Wilmer Honeycutt MRN [id number]. The patient called and spoke to Beryl Tobar to reschedule her appointment because a family member from her child's father got information regarding her previous appointment and showed up here. Over the past two weeks we have received two phones calls from someone named  Kenneth Leahy trying to get the patients appointment time and date. I informed the lady on the phone that due to the patient being over the age of 18 and also due to HIPPA I could not release that information to her unless she had the patient on the phone or unless she had an Involvement of Care form on file with her name listed, she said she understood the policy and hung up.       The patient came in for her appointment today and specifically stated she does not want Kenneth Leahy or Amador Leahy III at this clinic for any of her future appointments or at the hospital. Ms. Grecia Moon created a FYI in the patient's chart with the needed information and I also had the patient fill out an involvement of care form with a list of people that she approves of to receive her medical information. Please let me know if there is any precautions we need to follow here at the clinic to assure that the patient is protected. Thanks in advance.

## 2018-12-28 ENCOUNTER — HOSPITAL ENCOUNTER (OUTPATIENT)
Facility: HOSPITAL | Age: 23
Discharge: HOME | End: 2018-12-28
Attending: OBSTETRICS & GYNECOLOGY | Admitting: OBSTETRICS & GYNECOLOGY
Payer: MEDICAID

## 2018-12-28 VITALS
OXYGEN SATURATION: 98 % | HEIGHT: 63 IN | BODY MASS INDEX: 35.44 KG/M2 | HEART RATE: 71 BPM | SYSTOLIC BLOOD PRESSURE: 125 MMHG | WEIGHT: 200 LBS | DIASTOLIC BLOOD PRESSURE: 63 MMHG | TEMPERATURE: 98 F | RESPIRATION RATE: 16 BRPM

## 2018-12-28 DIAGNOSIS — O36.8190 DECREASED FETAL MOVEMENT: ICD-10-CM

## 2018-12-28 DIAGNOSIS — O36.8130 DECREASED FETAL MOVEMENTS IN THIRD TRIMESTER, SINGLE OR UNSPECIFIED FETUS: Primary | ICD-10-CM

## 2018-12-28 LAB
ABO + RH BLD: NORMAL
BASOPHILS # BLD AUTO: 0.02 K/UL
BASOPHILS NFR BLD: 0.1 %
BLD GP AB SCN CELLS X3 SERPL QL: NORMAL
DIFFERENTIAL METHOD: ABNORMAL
EOSINOPHIL # BLD AUTO: 0.2 K/UL
EOSINOPHIL NFR BLD: 1.1 %
ERYTHROCYTE [DISTWIDTH] IN BLOOD BY AUTOMATED COUNT: 12.9 %
HCT VFR BLD AUTO: 29.9 %
HGB BLD-MCNC: 9.7 G/DL
LYMPHOCYTES # BLD AUTO: 2.6 K/UL
LYMPHOCYTES NFR BLD: 17.5 %
MCH RBC QN AUTO: 30.4 PG
MCHC RBC AUTO-ENTMCNC: 32.4 G/DL
MCV RBC AUTO: 94 FL
MONOCYTES # BLD AUTO: 1.2 K/UL
MONOCYTES NFR BLD: 8.1 %
NEUTROPHILS # BLD AUTO: 11 K/UL
NEUTROPHILS NFR BLD: 73.2 %
PLATELET # BLD AUTO: 284 K/UL
PMV BLD AUTO: 11.3 FL
RBC # BLD AUTO: 3.19 M/UL
WBC # BLD AUTO: 15 K/UL

## 2018-12-28 PROCEDURE — 59025 FETAL NON-STRESS TEST: CPT

## 2018-12-28 PROCEDURE — 85025 COMPLETE CBC W/AUTO DIFF WBC: CPT

## 2018-12-28 PROCEDURE — 59025 PR FETAL 2N-STRESS TEST: ICD-10-PCS | Mod: 26,,, | Performed by: OBSTETRICS & GYNECOLOGY

## 2018-12-28 PROCEDURE — 96361 HYDRATE IV INFUSION ADD-ON: CPT | Mod: 59

## 2018-12-28 PROCEDURE — 36415 COLL VENOUS BLD VENIPUNCTURE: CPT

## 2018-12-28 PROCEDURE — 86592 SYPHILIS TEST NON-TREP QUAL: CPT

## 2018-12-28 PROCEDURE — G0378 HOSPITAL OBSERVATION PER HR: HCPCS

## 2018-12-28 PROCEDURE — 25000003 PHARM REV CODE 250: Performed by: OBSTETRICS & GYNECOLOGY

## 2018-12-28 PROCEDURE — 86850 RBC ANTIBODY SCREEN: CPT

## 2018-12-28 PROCEDURE — 99225 PR SUBSEQUENT OBSERVATION CARE,LEVEL II: ICD-10-PCS | Mod: 25,,, | Performed by: OBSTETRICS & GYNECOLOGY

## 2018-12-28 PROCEDURE — 59025 FETAL NON-STRESS TEST: CPT | Mod: 26,,, | Performed by: OBSTETRICS & GYNECOLOGY

## 2018-12-28 PROCEDURE — 99225 PR SUBSEQUENT OBSERVATION CARE,LEVEL II: CPT | Mod: 25,,, | Performed by: OBSTETRICS & GYNECOLOGY

## 2018-12-28 PROCEDURE — 96360 HYDRATION IV INFUSION INIT: CPT

## 2018-12-28 PROCEDURE — 99211 OFF/OP EST MAY X REQ PHY/QHP: CPT | Mod: 25

## 2018-12-28 RX ORDER — ACETAMINOPHEN 500 MG
500 TABLET ORAL EVERY 6 HOURS PRN
Status: DISCONTINUED | OUTPATIENT
Start: 2018-12-28 | End: 2018-12-28 | Stop reason: HOSPADM

## 2018-12-28 RX ORDER — ONDANSETRON 4 MG/1
8 TABLET, ORALLY DISINTEGRATING ORAL EVERY 8 HOURS PRN
Status: DISCONTINUED | OUTPATIENT
Start: 2018-12-28 | End: 2018-12-28 | Stop reason: HOSPADM

## 2018-12-28 RX ADMIN — SODIUM CHLORIDE, SODIUM LACTATE, POTASSIUM CHLORIDE, AND CALCIUM CHLORIDE 1000 ML: .6; .31; .03; .02 INJECTION, SOLUTION INTRAVENOUS at 07:12

## 2018-12-29 NOTE — H&P
History and Physical  Obstetrics      SUBJECTIVE:     Wilmer Honeycutt is a 23 y.o.  female  at 36w1d weeks gestation with an Estimated Date of Delivery: 19 who is admitted complaining of bright red vaginal spotting that started today as well as decreased fetal movement. She reports she has not felt the baby move all day today. She was seen in the office yesterday for her routine prenatal visit. She denies Leakage of Fluid and Contractions. Fetal Movement: decreased.    She has been followed prenatally with the following prenatal complications:   Patient Active Problem List   Diagnosis    Chlamydia infection affecting pregnancy    Encounter for supervision of normal first pregnancy in third trimester    Depression affecting pregnancy in third trimester, antepartum    UTI (urinary tract infection) during pregnancy, third trimester    Personal history of fall, presenting hazards to health    Decreased fetal movement         HISTORY:     Prior to Admission medications    Medication Sig Start Date End Date Taking? Authorizing Provider   ferrous sulfate (FEOSOL) 325 mg (65 mg iron) Tab tablet Take 1 tablet (325 mg total) by mouth once daily. 18 Yes Dominic Byrd MD   FLUoxetine (PROZAC) 20 MG capsule Take 1 capsule (20 mg total) by mouth once daily. 18 Yes Dominic Byrd MD   SQU90-AU-ch2-wpd-zhw-dfgt oil (PRENATAL GUMMY) 400 mcg-35 mg -25 mg-5 mg Chew Take by mouth.   Yes Historical Provider, MD   hydrocortisone-pramoxine (PROCTOFOAM HC) rectal foam Place 1 applicator rectally 2 (two) times daily. 18   Jennifer Finch MD   metroNIDAZOLE (FLAGYL) 500 MG tablet Take 1 tablet (500 mg total) by mouth 3 (three) times daily. for 7 days 12/27/18 1/3/19  Tita Castillo MD   ondansetron (ZOFRAN) 4 MG tablet Take 8 mg by mouth 2 (two) times daily.    Historical Provider, MD   promethazine (PHENERGAN) 25 MG tablet Take 25 mg by mouth every 4 (four) hours.     Historical Provider, MD     Outpatient Medications Marked as Taking for the 18 encounter (Hospital Encounter)   Medication Sig Dispense Refill    ferrous sulfate (FEOSOL) 325 mg (65 mg iron) Tab tablet Take 1 tablet (325 mg total) by mouth once daily. 30 tablet 3    FLUoxetine (PROZAC) 20 MG capsule Take 1 capsule (20 mg total) by mouth once daily. 30 capsule 11    PNV62-FA-om3-dha-epa-fish oil (PRENATAL GUMMY) 400 mcg-35 mg -25 mg-5 mg Chew Take by mouth.        Past Medical History:   Diagnosis Date    Anemia      History reviewed. No pertinent surgical history.  Family History   Problem Relation Age of Onset    Ovarian cancer Maternal Grandmother     Bladder Cancer Mother     Colon cancer Neg Hx     Breast cancer Neg Hx      Social History     Tobacco Use    Smoking status: Current Every Day Smoker     Packs/day: 0.25     Years: 10.00     Pack years: 2.50     Types: Cigarettes    Smokeless tobacco: Never Used   Substance Use Topics    Alcohol use: Yes     Alcohol/week: 0.6 oz     Types: 1 Glasses of wine per week     Frequency: Never     Comment: sips     Drug use: No     OB History    Para Term  AB Living   1             SAB TAB Ectopic Multiple Live Births                  # Outcome Date GA Lbr Fish/2nd Weight Sex Delivery Anes PTL Lv   1 Current                     Allergy:   Review of patient's allergies indicates:  No Known Allergies       ROS:   CONSTITUTIONAL: Negative for fever, chills, diaphoresis, weakness, fatigue, weight loss, weight gain  ENT: negative for sore throat, nasal congestion, nasal discharge, epistaxis, tinnitus, hearing loss  EYES: negative for blurry vision, decreased vision, loss of vision, eye pain, diplopia, photophobia, discharge  SKIN: Negative for rash, itching, hives  RESPIRATORY: negative for cough, hemoptysis, shortness of breath, pleuritic chest pain, wheezing  CARDIOVASCULAR: negative for chest pain, dyspnea on exertion, orthopnea, paroxysmal  nocturnal dyspnea, edema, palpitations  BREAST: negative for breast  tenderness, breast mass, nipple discharge, or skin changes  GASTROINTESTINAL: negative for abdominal pain, flank pain, nausea, vomiting, diarrhea, constipation, black stool, blood in stool  GENITOURINARY: negative for abnormal vaginal bleeding, amenorrhea, decreased libido, dysuria, genital sores, hematuria, incontinence, menorrhagia, pelvic pain, urinary frequency, vaginal discharge  HEMATOLOGIC/LYMPHATIC: negative for swollen lymph nodes, bleeding, bruising  MUSCULOSKELETAL: negative for back pain, joint pain, joint stiffness, joint swelling, muscle pain, muscle weakness  NEUROLOGICAL: negative for dizzy/vertigo, headache, focal weakness, numbness/tingling, speech problems, loss of consciousness, confusion, memory loss  BEHAVORIAL/PSYCH: negative for anxiety, depression, psychiatric illness  ENDOCRINE: negative for polydipsia/polyuria, palpitations, skin changes, temperature intolerance, unexpected weight changes  ALLERGIC/IMMUNOLOGIC: negative for urticaria, hay fever, angioedema      OBJECTIVE:     Initial Vitals [12/28/18 1840]   BP Pulse Resp Temp SpO2   -- -- 20 97.7 °F (36.5 °C) --      MAP       --             Physical Exam:  General:  alert,normal appearing gravid female   Skin:  Skin color, texture, turgor normal. No rashes or lesions   HEENT:  conjunctivae/corneas clear. THERESA   Lungs:  clear to auscultation bilaterally   Heart:  regular rate and rhythm, S1, S2 normal, no murmur, click, rub or gallop   Breasts:  no discharge, erythema, or tenderness   Abdomen:  soft, non-tender; bowel sounds normal   Uterine Size:  consistent with dates   Presentations:  cephalic   FHT:  140 BPM   Pelvis: External genitalia: normal external genitalia without lesions  Vaginal: presence of blood, without lesions or discharge   Cervix:     Dilation: 1 cm    Effacement: 20 %    Station:  -3    Consistency: firm    Position: posterior       OB Lab History:      Group & Rh   Date Value Ref Range Status   05/29/2018 A POS  Final     Indirect [place]cia   Date Value Ref Range Status   05/29/2018 NEG  Final     Lab Results   Component Value Date    WBC 15.15 (H) 12/09/2018    HGB 10.4 (L) 12/09/2018    HCT 30.8 (L) 12/09/2018    MCV 93 12/09/2018     12/09/2018     No results found for: TSH  Lab Results   Component Value Date    RUBELLAIGGAN 19.0 05/29/2018     Lab Results   Component Value Date    RPR Non-reactive 05/29/2018     HIV 1/2 Ag/Ab   Date Value Ref Range Status   05/29/2018 Negative Negative Final     Hepatitis B Surface Ag   Date Value Ref Range Status   05/29/2018 Negative  Final     Results for orders placed or performed in visit on 11/07/18   OB Glucose Screen   Result Value Ref Range    OB Glucose Screen 112 70 - 140 mg/dL     No results found for this or any previous visit.  No results found for this or any previous visit.  Results for orders placed or performed in visit on 08/02/18   C. trachomatis/N. gonorrhoeae by AMP DNA   Result Value Ref Range    Chlamydia, Amplified DNA Not Detected Not Detected    N gonorrhoeae, amplified DNA Not Detected Not Detected       ASSESSMENT/PLAN:     IUP 36w1d gestation  Patient Active Problem List   Diagnosis    Chlamydia infection affecting pregnancy    Encounter for supervision of normal first pregnancy in third trimester    Depression affecting pregnancy in third trimester, antepartum    UTI (urinary tract infection) during pregnancy, third trimester    Personal history of fall, presenting hazards to health    Decreased fetal movement         PLAN:   IVFs  NST with prolonged monitoring  Admit labs

## 2018-12-29 NOTE — NURSING
Discharge instructions given to patient and sister. Discussed reasons to return to L&D, labor precautions, and importance of fetal kick counts. Clinic and hospital phone numbers provided. Educational material on third trimester, kick counts, and neva crystal provided. Pt scheduled to f/u with PCP on 1-2-19. Questions addressed at this time. V/u Patient and family ambulated off unit.

## 2018-12-29 NOTE — DISCHARGE INSTRUCTIONS
Return to the labor unit or call ob nurse at hospital if:    1.  Any questions whether the water bag broke or is leaking (sudden gush or suspected leak).   2.  If 35 wks or greater, no need to call about passing the mucous plug if no other signs of labor.  3.  You may have spotting for a day or two from the vaginal exam  4.  Please report heavier vaginal bleeding (requires you to put a pad on or blood is running down your leg)  5. Report if you are having more than 6 contractions in an hour and less than 37 weeks, but at 37-38 wks. and beyond you can time your contractions and notify doctor if they are longer, stronger and closer together  6.  You should call if you are experiencing sharp abdominal pains or severe cramping.    7.  If you are > or = 28 wks, Do fetal kick counts 2 times a day  &  report decreased fetal movement:  You should feel 10 movements in 2 hours or less.  Notify MD or OB nurse as soon as possible if you are not getting the required movements or if  it is taking you longer and longer to get the 10 movements and DO NOT WAIT UNTIL TOMORROW EVEN IF YOU HAVE AN APPT.  8.  Drink plenty of water and empty your bladder often, avoid caffeine & carbonated beverages as much as possible & avoid smoking  9.  Keep your appt. as scheduled    Office phone # [phone]  If after office hours, on the weekend, or unable to reach the nurse at the office, call the Hospital phone # [phone] & ask to speak to the OB nurse

## 2018-12-29 NOTE — PROCEDURES
FETAL ASSESSMENT REPORT    RE: Wilmer Honeycutt  MRN:  [id number]  :  1995  AGE:  23 y.o.    Date:  2018    REFERRAL PHYSICIAN: Dr. Shaista Christy    Allergies: Patient has no known allergies.    Wilmer is a 23 y.o.  at 36w1d gestational age here today for a NST.    2019, by Last Menstrual Period    MEDICATIONS AT TIME OF TEST:  Current Facility-Administered Medications   Medication Dose Route Frequency Provider Last Rate Last Dose    acetaminophen tablet 500 mg  500 mg Oral Q6H PRN Tita Castillo MD        lactated ringers bolus 1,000 mL  1,000 mL Intravenous Once Tita Castillo MD   1,000 mL at 18 1902    ondansetron disintegrating tablet 8 mg  8 mg Oral Q8H PRN Tita Castillo MD        promethazine (PHENERGAN) 12.5 mg in dextrose 5 % 50 mL IVPB  12.5 mg Intravenous Q6H PRN Tita Castillo MD           Indication: decreased fetal movement    Interpretation:  150 BPM baseline    Variability:  Good {> 6 bpm)    Accelerations:  Reactive    Decelerations:  none    Assessment: reactive    Plan:  discussed fetal movement, NST reactive      Shaista Christy MD  2018; 8:22 PM

## 2018-12-29 NOTE — DISCHARGE SUMMARY
Discharge Note    SUMMARY     Admit Date: 12/28/2018    Attending Physician: Tita Castillo MD     Discharge Physician: Shaista Christy MD    Discharge Date: 12/28/2018     Admit Diagnosis: IUP @ 36 1/7 WGA with decreased fetal movement    Final Diagnosis: Reactive and reassuring NST with audible fetal movement    Procedure: NST    Disposition: Home or Self Care    Condition: Stable    Hospital Course: Presented evaluation of decreased fetal movement.  NST reactive and reassuring.      Patient Instructions:   Current Discharge Medication List      CONTINUE these medications which have NOT CHANGED    Details   ferrous sulfate (FEOSOL) 325 mg (65 mg iron) Tab tablet Take 1 tablet (325 mg total) by mouth once daily.  Qty: 30 tablet, Refills: 3      FLUoxetine (PROZAC) 20 MG capsule Take 1 capsule (20 mg total) by mouth once daily.  Qty: 30 capsule, Refills: 11    Associated Diagnoses: Depression affecting pregnancy in second trimester, antepartum      PNV62-FA-om3-dha-epa-fish oil (PRENATAL GUMMY) 400 mcg-35 mg -25 mg-5 mg Chew Take by mouth.      hydrocortisone-pramoxine (PROCTOFOAM HC) rectal foam Place 1 applicator rectally 2 (two) times daily.  Qty: 10 g, Refills: 0    Associated Diagnoses: Hemorrhoids during pregnancy in third trimester      metroNIDAZOLE (FLAGYL) 500 MG tablet Take 1 tablet (500 mg total) by mouth 3 (three) times daily. for 7 days  Qty: 21 tablet, Refills: 0      ondansetron (ZOFRAN) 4 MG tablet Take 8 mg by mouth 2 (two) times daily.      promethazine (PHENERGAN) 25 MG tablet Take 25 mg by mouth every 4 (four) hours.             Discharge Procedure Orders (must include Diet, Follow-up, Activity)   Discharge Procedure Orders (must include Diet, Follow-up, Activity)   Call MD for:  temperature >100.4     Call MD for:  persistent nausea and vomiting or diarrhea     Call MD for:  severe uncontrolled pain     Call MD for:  redness, tenderness, or signs of infection (pain, swelling,  redness, odor or green/yellow discharge around incision site)     Call MD for:  difficulty breathing or increased cough     Call MD for:  severe persistent headache     Call MD for:  worsening rash     Call MD for:  persistent dizziness, light-headedness, or visual disturbances     Call MD for:  increased confusion or weakness     Call MD for:   Order Comments: Obstetric patients: Call for decreased fetal movement, regular uterine contractions, leakage of fluid, vaginal bleeding or any other concerns  Gynecology patients: Call for incisional drainage, redness, or tenderness, abnormal vaginal bleeding or discharge or any other concerns         Follow-up Information     Tita Castillo MD.    Specialty:  Obstetrics and Gynecology  Why:  routine OB visit  Contact information:  [address] [place] [id number] [phone]

## 2018-12-31 LAB
BACTERIA SPEC AEROBE CULT: NORMAL
RPR SER QL: NORMAL

## 2019-01-02 ENCOUNTER — ROUTINE PRENATAL (OUTPATIENT)
Dept: OBSTETRICS AND GYNECOLOGY | Facility: CLINIC | Age: 24
End: 2019-01-02
Payer: MEDICAID

## 2019-01-02 VITALS
DIASTOLIC BLOOD PRESSURE: 80 MMHG | SYSTOLIC BLOOD PRESSURE: 120 MMHG | WEIGHT: 201 LBS | HEART RATE: 80 BPM | BODY MASS INDEX: 35.61 KG/M2

## 2019-01-02 DIAGNOSIS — F32.A DEPRESSION AFFECTING PREGNANCY IN THIRD TRIMESTER, ANTEPARTUM: ICD-10-CM

## 2019-01-02 DIAGNOSIS — A74.9 CHLAMYDIA INFECTION AFFECTING PREGNANCY IN THIRD TRIMESTER: ICD-10-CM

## 2019-01-02 DIAGNOSIS — Z3A.36 36 WEEKS GESTATION OF PREGNANCY: ICD-10-CM

## 2019-01-02 DIAGNOSIS — O99.343 DEPRESSION AFFECTING PREGNANCY IN THIRD TRIMESTER, ANTEPARTUM: ICD-10-CM

## 2019-01-02 DIAGNOSIS — Z34.03 ENCOUNTER FOR SUPERVISION OF NORMAL FIRST PREGNANCY IN THIRD TRIMESTER: Primary | ICD-10-CM

## 2019-01-02 DIAGNOSIS — O98.813 CHLAMYDIA INFECTION AFFECTING PREGNANCY IN THIRD TRIMESTER: ICD-10-CM

## 2019-01-02 PROBLEM — O36.8190 DECREASED FETAL MOVEMENT: Status: RESOLVED | Noted: 2018-12-28 | Resolved: 2019-01-02

## 2019-01-02 PROBLEM — Z91.81 PERSONAL HISTORY OF FALL, PRESENTING HAZARDS TO HEALTH: Status: RESOLVED | Noted: 2018-12-09 | Resolved: 2019-01-02

## 2019-01-02 PROCEDURE — 99999 PR PBB SHADOW E&M-EST. PATIENT-LVL III: CPT | Mod: PBBFAC,,, | Performed by: OBSTETRICS & GYNECOLOGY

## 2019-01-02 PROCEDURE — 99213 OFFICE O/P EST LOW 20 MIN: CPT | Mod: PBBFAC,TH | Performed by: OBSTETRICS & GYNECOLOGY

## 2019-01-02 PROCEDURE — 99213 OFFICE O/P EST LOW 20 MIN: CPT | Mod: TH,S$PBB,, | Performed by: OBSTETRICS & GYNECOLOGY

## 2019-01-02 PROCEDURE — 99999 PR PBB SHADOW E&M-EST. PATIENT-LVL III: ICD-10-PCS | Mod: PBBFAC,,, | Performed by: OBSTETRICS & GYNECOLOGY

## 2019-01-02 PROCEDURE — 99213 PR OFFICE/OUTPT VISIT, EST, LEVL III, 20-29 MIN: ICD-10-PCS | Mod: TH,S$PBB,, | Performed by: OBSTETRICS & GYNECOLOGY

## 2019-01-04 ENCOUNTER — TELEPHONE (OUTPATIENT)
Dept: OBSTETRICS AND GYNECOLOGY | Facility: CLINIC | Age: 24
End: 2019-01-04

## 2019-01-04 ENCOUNTER — HOSPITAL ENCOUNTER (OUTPATIENT)
Facility: HOSPITAL | Age: 24
Discharge: HOME | End: 2019-01-04
Attending: SURGERY | Admitting: OBSTETRICS & GYNECOLOGY
Payer: MEDICAID

## 2019-01-04 VITALS
DIASTOLIC BLOOD PRESSURE: 66 MMHG | SYSTOLIC BLOOD PRESSURE: 120 MMHG | OXYGEN SATURATION: 98 % | RESPIRATION RATE: 16 BRPM | TEMPERATURE: 98 F | HEART RATE: 86 BPM | WEIGHT: 202 LBS | BODY MASS INDEX: 35.78 KG/M2

## 2019-01-04 DIAGNOSIS — T59.91XA TOXIC INHALATION INJURY, ACCIDENTAL OR UNINTENTIONAL, INITIAL ENCOUNTER: Primary | ICD-10-CM

## 2019-01-04 DIAGNOSIS — R53.83 FATIGUE: ICD-10-CM

## 2019-01-04 PROBLEM — T59.94XA TOXIC INHALATION INJURY: Status: ACTIVE | Noted: 2019-01-04

## 2019-01-04 LAB
ALBUMIN SERPL BCP-MCNC: 2.7 G/DL
ALP SERPL-CCNC: 168 U/L
ALT SERPL W/O P-5'-P-CCNC: 15 U/L
ANION GAP SERPL CALC-SCNC: 10 MMOL/L
AST SERPL-CCNC: 18 U/L
BASOPHILS # BLD AUTO: 0.02 K/UL
BASOPHILS NFR BLD: 0.2 %
BILIRUB SERPL-MCNC: 0.3 MG/DL
BILIRUB UR QL STRIP: NEGATIVE
BNP SERPL-MCNC: 24 PG/ML
BUN SERPL-MCNC: 7 MG/DL
CALCIUM SERPL-MCNC: 8.9 MG/DL
CHLORIDE SERPL-SCNC: 107 MMOL/L
CK MB SERPL-MCNC: 0.6 NG/ML
CK MB SERPL-RTO: 1.9 %
CK SERPL-CCNC: 32 U/L
CK SERPL-CCNC: 32 U/L
CLARITY UR: CLEAR
CO2 SERPL-SCNC: 21 MMOL/L
COLOR UR: YELLOW
CREAT SERPL-MCNC: 0.6 MG/DL
DIFFERENTIAL METHOD: ABNORMAL
EOSINOPHIL # BLD AUTO: 0.1 K/UL
EOSINOPHIL NFR BLD: 1.3 %
ERYTHROCYTE [DISTWIDTH] IN BLOOD BY AUTOMATED COUNT: 12.9 %
EST. GFR  (AFRICAN AMERICAN): >60 ML/MIN/1.73 M^2
EST. GFR  (NON AFRICAN AMERICAN): >60 ML/MIN/1.73 M^2
GLUCOSE SERPL-MCNC: 76 MG/DL
GLUCOSE UR QL STRIP: NEGATIVE
HCT VFR BLD AUTO: 31.1 %
HGB BLD-MCNC: 10.3 G/DL
HGB UR QL STRIP: NEGATIVE
KETONES UR QL STRIP: NEGATIVE
LEUKOCYTE ESTERASE UR QL STRIP: NEGATIVE
LYMPHOCYTES # BLD AUTO: 1.9 K/UL
LYMPHOCYTES NFR BLD: 18.1 %
MAGNESIUM SERPL-MCNC: 1.6 MG/DL
MCH RBC QN AUTO: 30.7 PG
MCHC RBC AUTO-ENTMCNC: 33.1 G/DL
MCV RBC AUTO: 93 FL
MONOCYTES # BLD AUTO: 1 K/UL
MONOCYTES NFR BLD: 9.2 %
NEUTROPHILS # BLD AUTO: 7.4 K/UL
NEUTROPHILS NFR BLD: 71.2 %
NITRITE UR QL STRIP: NEGATIVE
PH UR STRIP: 7 [PH] (ref 5–8)
PHOSPHATE SERPL-MCNC: 4.4 MG/DL
PLATELET # BLD AUTO: 294 K/UL
PMV BLD AUTO: 11.6 FL
POTASSIUM SERPL-SCNC: 3.9 MMOL/L
PROT SERPL-MCNC: 6.5 G/DL
PROT UR QL STRIP: NEGATIVE
RBC # BLD AUTO: 3.35 M/UL
SODIUM SERPL-SCNC: 138 MMOL/L
SP GR UR STRIP: 1.02 (ref 1–1.03)
TROPONIN I SERPL DL<=0.01 NG/ML-MCNC: <0.006 NG/ML
TSH SERPL DL<=0.005 MIU/L-ACNC: 1.46 UIU/ML
URN SPEC COLLECT METH UR: NORMAL
UROBILINOGEN UR STRIP-ACNC: NEGATIVE EU/DL
WBC # BLD AUTO: 10.39 K/UL

## 2019-01-04 PROCEDURE — 84484 ASSAY OF TROPONIN QUANT: CPT

## 2019-01-04 PROCEDURE — 59025 FETAL NON-STRESS TEST: CPT

## 2019-01-04 PROCEDURE — 59025 FETAL NON-STRESS TEST: CPT | Mod: 26,,, | Performed by: OBSTETRICS & GYNECOLOGY

## 2019-01-04 PROCEDURE — 82550 ASSAY OF CK (CPK): CPT

## 2019-01-04 PROCEDURE — G0378 HOSPITAL OBSERVATION PER HR: HCPCS

## 2019-01-04 PROCEDURE — 93005 ELECTROCARDIOGRAM TRACING: CPT

## 2019-01-04 PROCEDURE — 36415 COLL VENOUS BLD VENIPUNCTURE: CPT

## 2019-01-04 PROCEDURE — 99219 PR INITIAL OBSERVATION CARE,LEVL II: ICD-10-PCS | Mod: 25,,, | Performed by: OBSTETRICS & GYNECOLOGY

## 2019-01-04 PROCEDURE — 84443 ASSAY THYROID STIM HORMONE: CPT

## 2019-01-04 PROCEDURE — 93010 ELECTROCARDIOGRAM REPORT: CPT | Mod: ,,, | Performed by: INTERNAL MEDICINE

## 2019-01-04 PROCEDURE — 81003 URINALYSIS AUTO W/O SCOPE: CPT

## 2019-01-04 PROCEDURE — 93010 EKG 12-LEAD: ICD-10-PCS | Mod: ,,, | Performed by: INTERNAL MEDICINE

## 2019-01-04 PROCEDURE — 82553 CREATINE MB FRACTION: CPT

## 2019-01-04 PROCEDURE — 83735 ASSAY OF MAGNESIUM: CPT

## 2019-01-04 PROCEDURE — 99219 PR INITIAL OBSERVATION CARE,LEVL II: CPT | Mod: 25,,, | Performed by: OBSTETRICS & GYNECOLOGY

## 2019-01-04 PROCEDURE — 85025 COMPLETE CBC W/AUTO DIFF WBC: CPT

## 2019-01-04 PROCEDURE — 99285 EMERGENCY DEPT VISIT HI MDM: CPT | Mod: 25

## 2019-01-04 PROCEDURE — 59025 PR FETAL 2N-STRESS TEST: ICD-10-PCS | Mod: 26,,, | Performed by: OBSTETRICS & GYNECOLOGY

## 2019-01-04 PROCEDURE — 84100 ASSAY OF PHOSPHORUS: CPT

## 2019-01-04 PROCEDURE — 80053 COMPREHEN METABOLIC PANEL: CPT

## 2019-01-04 PROCEDURE — 83880 ASSAY OF NATRIURETIC PEPTIDE: CPT
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
## 2019-01-05 NOTE — DISCHARGE INSTRUCTIONS
Return to the labor unit or call ob nurse at hospital if:    1.  Any questions whether the water bag broke or is leaking (sudden gush or suspected leak).   2.  If 35 wks or greater, no need to call about passing the mucous plug if no other signs of labor.  3.  You may have spotting for a day or two from the vaginal exam  4.  Please report heavier vaginal bleeding (requires you to put a pad on or blood is running down your leg)  5. Report if you are having more than 6 contractions in an hour and less than 37 weeks, but at 37-38 wks. and beyond you can time your contractions and notify doctor if they are longer, stronger and closer together  6.  You should call if you are experiencing sharp abdominal pains or severe cramping.    7.  If you are > or = 28 wks, Do fetal kick counts 2 times a day  &  report decreased fetal movement:  You should feel 10 movements in 2 hours or less.  Notify MD or OB nurse as soon as possible if you are not getting the required movements or if  it is taking you longer and longer to get the 10 movements and DO NOT WAIT UNTIL TOMORROW EVEN IF YOU HAVE AN APPT.  8.  Drink plenty of water and empty your bladder often, avoid caffeine & carbonated beverages as much as possible & avoid smoking  9.  Keep your appt. as scheduled    Office phone # [phone]  If after office hours, on the weekend, or unable to reach the nurse at the office, call the Hospital phone # [phone] & ask to speak to the OB nurse

## 2019-01-05 NOTE — SUBJECTIVE & OBJECTIVE
Obstetric HPI:   active fetal movement, No vaginal bleeding , No loss of fluid       Obstetric History       T0      L0     SAB0   TAB0   Ectopic0   Multiple0   Live Births0       # Outcome Date GA Lbr Fish/2nd Weight Sex Delivery Anes PTL Lv   1 Current                 Past Medical History:   Diagnosis Date    Anemia      History reviewed. No pertinent surgical history.    PTA Medications   Medication Sig    ferrous sulfate (FEOSOL) 325 mg (65 mg iron) Tab tablet Take 1 tablet (325 mg total) by mouth once daily.    FLUoxetine (PROZAC) 20 MG capsule Take 1 capsule (20 mg total) by mouth once daily.    hydrocortisone-pramoxine (PROCTOFOAM HC) rectal foam Place 1 applicator rectally 2 (two) times daily.    ondansetron (ZOFRAN) 4 MG tablet Take 8 mg by mouth 2 (two) times daily.    PNV62-FA-om3-dha-epa-fish oil (PRENATAL GUMMY) 400 mcg-35 mg -25 mg-5 mg Chew Take by mouth.    promethazine (PHENERGAN) 25 MG tablet Take 25 mg by mouth every 4 (four) hours.    [DISCONTINUED] metroNIDAZOLE (FLAGYL) 500 MG tablet Take 1 tablet (500 mg total) by mouth 3 (three) times daily. for 7 days       Review of patient's allergies indicates:  No Known Allergies     Family History     Problem Relation (Age of Onset)    Bladder Cancer Mother    Ovarian cancer Maternal Grandmother        Tobacco Use    Smoking status: Current Every Day Smoker     Packs/day: 0.25     Years: 10.00     Pack years: 2.50     Types: Cigarettes    Smokeless tobacco: Never Used   Substance and Sexual Activity    Alcohol use: Yes     Alcohol/week: 0.6 oz     Types: 1 Glasses of wine per week     Frequency: Never     Comment: sips     Drug use: No    Sexual activity: Yes     Partners: Male     Comment: Single     Review of Systems   Constitutional: Negative for activity change, appetite change, chills, diaphoresis, fatigue and fever.   Eyes: Negative for visual disturbance.   Respiratory: Negative for cough, shortness of breath and  wheezing.    Cardiovascular: Negative for chest pain and palpitations.   Gastrointestinal: Negative for abdominal pain, constipation, diarrhea, nausea and vomiting.   Genitourinary: Negative for dysuria, genital sores, pelvic pain, urgency, vaginal bleeding, vaginal discharge, vaginal pain and vaginal odor.   Musculoskeletal: Negative for back pain, joint swelling and myalgias.   Neurological: Negative for seizures, syncope, numbness and headaches.   Hematological: Negative for adenopathy. Does not bruise/bleed easily.   Psychiatric/Behavioral: Negative for depression. The patient is not nervous/anxious.       Objective:     Vital Signs (Most Recent):  Temp: 98 °F (36.7 °C) (01/04/19 1538)  Pulse: 86 (01/04/19 1538)  Resp: 16 (01/04/19 1538)  BP: 120/66 (01/04/19 1538)  SpO2: 98 % (01/04/19 1538) Vital Signs (24h Range):  Temp:  [98 °F (36.7 °C)] 98 °F (36.7 °C)  Pulse:  [86] 86  Resp:  [16] 16  SpO2:  [98 %] 98 %  BP: (120)/(66) 120/66     Weight: 91.6 kg (202 lb)  Body mass index is 35.78 kg/m².    FHT: 190 Cat 1 (reassuring)  TOCO: irregular contractions    Physical Exam:   Constitutional: She is oriented to person, place, and time. She appears well-developed and well-nourished. No distress.    HENT:   Head: Normocephalic and atraumatic.    Eyes: Conjunctivae and EOM are normal.    Neck: Normal range of motion. Neck supple.     Pulmonary/Chest: Effort normal.                  Musculoskeletal: Normal range of motion.       Neurological: She is alert and oriented to person, place, and time.    Skin: Skin is warm and dry.    Psychiatric: She has a normal mood and affect. Her behavior is normal. Judgment and thought content normal.       Significant Labs:  Lab Results   Component Value Date    GROUPTRH A POS 12/28/2018    HEPBSAG Negative 05/29/2018    STREPBCULT No Group B Streptococcus isolated 12/27/2018       I have personallly reviewed all pertinent lab results from the last 24 hours.
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
## 2019-01-09 ENCOUNTER — ROUTINE PRENATAL (OUTPATIENT)
Dept: OBSTETRICS AND GYNECOLOGY | Facility: CLINIC | Age: 24
End: 2019-01-09
Payer: MEDICAID

## 2019-01-09 VITALS
DIASTOLIC BLOOD PRESSURE: 60 MMHG | WEIGHT: 202 LBS | SYSTOLIC BLOOD PRESSURE: 120 MMHG | HEART RATE: 80 BPM | BODY MASS INDEX: 35.78 KG/M2

## 2019-01-09 DIAGNOSIS — Z3A.37 37 WEEKS GESTATION OF PREGNANCY: ICD-10-CM

## 2019-01-09 DIAGNOSIS — Z34.03 ENCOUNTER FOR SUPERVISION OF NORMAL FIRST PREGNANCY IN THIRD TRIMESTER: Primary | ICD-10-CM

## 2019-01-09 PROCEDURE — 99999 PR PBB SHADOW E&M-EST. PATIENT-LVL II: CPT | Mod: PBBFAC,,, | Performed by: OBSTETRICS & GYNECOLOGY

## 2019-01-09 PROCEDURE — 99212 OFFICE O/P EST SF 10 MIN: CPT | Mod: PBBFAC,TH | Performed by: OBSTETRICS & GYNECOLOGY

## 2019-01-09 PROCEDURE — 99999 PR PBB SHADOW E&M-EST. PATIENT-LVL II: ICD-10-PCS | Mod: PBBFAC,,, | Performed by: OBSTETRICS & GYNECOLOGY

## 2019-01-09 PROCEDURE — 99213 PR OFFICE/OUTPT VISIT, EST, LEVL III, 20-29 MIN: ICD-10-PCS | Mod: TH,S$PBB,, | Performed by: OBSTETRICS & GYNECOLOGY

## 2019-01-09 PROCEDURE — 99213 OFFICE O/P EST LOW 20 MIN: CPT | Mod: TH,S$PBB,, | Performed by: OBSTETRICS & GYNECOLOGY

## 2019-01-16 ENCOUNTER — HOSPITAL ENCOUNTER (INPATIENT)
Facility: HOSPITAL | Age: 24
LOS: 1 days | Discharge: HOME | End: 2019-01-17
Attending: OBSTETRICS & GYNECOLOGY | Admitting: OBSTETRICS & GYNECOLOGY
Payer: MEDICAID

## 2019-01-16 ENCOUNTER — ANESTHESIA EVENT (OUTPATIENT)
Dept: OBSTETRICS AND GYNECOLOGY | Facility: HOSPITAL | Age: 24
End: 2019-01-16
Payer: MEDICAID

## 2019-01-16 ENCOUNTER — ANESTHESIA (OUTPATIENT)
Dept: OBSTETRICS AND GYNECOLOGY | Facility: HOSPITAL | Age: 24
End: 2019-01-16
Payer: MEDICAID

## 2019-01-16 DIAGNOSIS — Z37.9 NORMAL LABOR: Primary | ICD-10-CM

## 2019-01-16 LAB
ABO + RH BLD: NORMAL
BASOPHILS # BLD AUTO: 0.02 K/UL
BASOPHILS NFR BLD: 0.2 %
BLD GP AB SCN CELLS X3 SERPL QL: NORMAL
DIFFERENTIAL METHOD: ABNORMAL
EOSINOPHIL # BLD AUTO: 0.1 K/UL
EOSINOPHIL NFR BLD: 0.8 %
ERYTHROCYTE [DISTWIDTH] IN BLOOD BY AUTOMATED COUNT: 13.1 %
HCT VFR BLD AUTO: 29.5 %
HGB BLD-MCNC: 9.7 G/DL
LYMPHOCYTES # BLD AUTO: 2.4 K/UL
LYMPHOCYTES NFR BLD: 21.6 %
MCH RBC QN AUTO: 30.2 PG
MCHC RBC AUTO-ENTMCNC: 32.9 G/DL
MCV RBC AUTO: 92 FL
MONOCYTES # BLD AUTO: 0.9 K/UL
MONOCYTES NFR BLD: 8.4 %
NEUTROPHILS # BLD AUTO: 7.6 K/UL
NEUTROPHILS NFR BLD: 69 %
PLATELET # BLD AUTO: 285 K/UL
PMV BLD AUTO: 11.5 FL
RBC # BLD AUTO: 3.21 M/UL
RPR SER QL: NORMAL
WBC # BLD AUTO: 11 K/UL

## 2019-01-16 PROCEDURE — 25000003 PHARM REV CODE 250: Performed by: OBSTETRICS & GYNECOLOGY

## 2019-01-16 PROCEDURE — S4991 NICOTINE PATCH NONLEGEND: HCPCS | Performed by: OBSTETRICS & GYNECOLOGY

## 2019-01-16 PROCEDURE — 59409 PR OBSTETRICAL CARE,VAG DELIV ONLY: ICD-10-PCS | Mod: AT,,, | Performed by: OBSTETRICS & GYNECOLOGY

## 2019-01-16 PROCEDURE — 25000003 PHARM REV CODE 250: Performed by: NURSE ANESTHETIST, CERTIFIED REGISTERED

## 2019-01-16 PROCEDURE — 62326 NJX INTERLAMINAR LMBR/SAC: CPT | Performed by: NURSE ANESTHETIST, CERTIFIED REGISTERED

## 2019-01-16 PROCEDURE — 99211 OFF/OP EST MAY X REQ PHY/QHP: CPT | Mod: 25

## 2019-01-16 PROCEDURE — 72100003 HC LABOR CARE, EA. ADDL. 8 HRS

## 2019-01-16 PROCEDURE — 63600175 PHARM REV CODE 636 W HCPCS: Performed by: OBSTETRICS & GYNECOLOGY

## 2019-01-16 PROCEDURE — 59409 OBSTETRICAL CARE: CPT | Mod: AT,,, | Performed by: OBSTETRICS & GYNECOLOGY

## 2019-01-16 PROCEDURE — 85025 COMPLETE CBC W/AUTO DIFF WBC: CPT

## 2019-01-16 PROCEDURE — 36415 COLL VENOUS BLD VENIPUNCTURE: CPT

## 2019-01-16 PROCEDURE — 86901 BLOOD TYPING SEROLOGIC RH(D): CPT

## 2019-01-16 PROCEDURE — 11000001 HC ACUTE MED/SURG PRIVATE ROOM

## 2019-01-16 PROCEDURE — 72100002 HC LABOR CARE, 1ST 8 HOURS

## 2019-01-16 PROCEDURE — 86592 SYPHILIS TEST NON-TREP QUAL: CPT

## 2019-01-16 PROCEDURE — 01967 NEURAXL LBR ANES VAG DLVR: CPT | Mod: QZ | Performed by: NURSE ANESTHETIST, CERTIFIED REGISTERED

## 2019-01-16 PROCEDURE — 72200005 HC VAGINAL DELIVERY LEVEL II

## 2019-01-16 RX ORDER — OXYTOCIN/RINGER'S LACTATE 20/1000 ML
333 PLASTIC BAG, INJECTION (ML) INTRAVENOUS CONTINUOUS
Status: DISCONTINUED | OUTPATIENT
Start: 2019-01-16 | End: 2019-01-16

## 2019-01-16 RX ORDER — DOCUSATE SODIUM 100 MG/1
200 CAPSULE, LIQUID FILLED ORAL 2 TIMES DAILY PRN
Status: DISCONTINUED | OUTPATIENT
Start: 2019-01-16 | End: 2019-01-17 | Stop reason: HOSPADM

## 2019-01-16 RX ORDER — MISOPROSTOL 200 UG/1
600 TABLET ORAL
Status: DISCONTINUED | OUTPATIENT
Start: 2019-01-16 | End: 2019-01-17 | Stop reason: HOSPADM

## 2019-01-16 RX ORDER — SODIUM CHLORIDE 9 MG/ML
INJECTION, SOLUTION INTRAVENOUS
Status: DISCONTINUED | OUTPATIENT
Start: 2019-01-16 | End: 2019-01-16

## 2019-01-16 RX ORDER — ACETAMINOPHEN 325 MG/1
650 TABLET ORAL EVERY 6 HOURS PRN
Status: DISCONTINUED | OUTPATIENT
Start: 2019-01-16 | End: 2019-01-17 | Stop reason: HOSPADM

## 2019-01-16 RX ORDER — DIPHENHYDRAMINE HCL 25 MG
25 CAPSULE ORAL EVERY 4 HOURS PRN
Status: DISCONTINUED | OUTPATIENT
Start: 2019-01-16 | End: 2019-01-17 | Stop reason: HOSPADM

## 2019-01-16 RX ORDER — SODIUM CHLORIDE, SODIUM LACTATE, POTASSIUM CHLORIDE, CALCIUM CHLORIDE 600; 310; 30; 20 MG/100ML; MG/100ML; MG/100ML; MG/100ML
INJECTION, SOLUTION INTRAVENOUS CONTINUOUS
Status: DISCONTINUED | OUTPATIENT
Start: 2019-01-16 | End: 2019-01-16

## 2019-01-16 RX ORDER — LIDOCAINE HCL/EPINEPHRINE/PF 2%-1:200K
VIAL (ML) INJECTION
Status: DISCONTINUED | OUTPATIENT
Start: 2019-01-16 | End: 2019-01-16

## 2019-01-16 RX ORDER — ONDANSETRON 4 MG/1
8 TABLET, ORALLY DISINTEGRATING ORAL EVERY 8 HOURS PRN
Status: DISCONTINUED | OUTPATIENT
Start: 2019-01-16 | End: 2019-01-16

## 2019-01-16 RX ORDER — DIPHENHYDRAMINE HYDROCHLORIDE 50 MG/ML
25 INJECTION INTRAMUSCULAR; INTRAVENOUS EVERY 4 HOURS PRN
Status: DISCONTINUED | OUTPATIENT
Start: 2019-01-16 | End: 2019-01-17 | Stop reason: HOSPADM

## 2019-01-16 RX ORDER — FLUOXETINE HYDROCHLORIDE 20 MG/1
20 CAPSULE ORAL DAILY
Status: DISCONTINUED | OUTPATIENT
Start: 2019-01-16 | End: 2019-01-16

## 2019-01-16 RX ORDER — IBUPROFEN 600 MG/1
600 TABLET ORAL EVERY 6 HOURS PRN
Status: DISCONTINUED | OUTPATIENT
Start: 2019-01-16 | End: 2019-01-17 | Stop reason: HOSPADM

## 2019-01-16 RX ORDER — HYDROCODONE BITARTRATE AND ACETAMINOPHEN 5; 325 MG/1; MG/1
1 TABLET ORAL EVERY 4 HOURS PRN
Status: DISCONTINUED | OUTPATIENT
Start: 2019-01-16 | End: 2019-01-17 | Stop reason: HOSPADM

## 2019-01-16 RX ORDER — IBUPROFEN 200 MG
1 TABLET ORAL DAILY
Status: DISCONTINUED | OUTPATIENT
Start: 2019-01-16 | End: 2019-01-17 | Stop reason: HOSPADM

## 2019-01-16 RX ORDER — OXYTOCIN/RINGER'S LACTATE 20/1000 ML
41.7 PLASTIC BAG, INJECTION (ML) INTRAVENOUS CONTINUOUS
Status: DISCONTINUED | OUTPATIENT
Start: 2019-01-16 | End: 2019-01-16

## 2019-01-16 RX ORDER — PRENATAL WITH FERROUS FUM AND FOLIC ACID 3080; 920; 120; 400; 22; 1.84; 3; 20; 10; 1; 12; 200; 27; 25; 2 [IU]/1; [IU]/1; MG/1; [IU]/1; MG/1; MG/1; MG/1; MG/1; MG/1; MG/1; UG/1; MG/1; MG/1; MG/1; MG/1
1 TABLET ORAL DAILY
Status: DISCONTINUED | OUTPATIENT
Start: 2019-01-16 | End: 2019-01-17 | Stop reason: HOSPADM

## 2019-01-16 RX ORDER — ONDANSETRON 8 MG/1
8 TABLET, ORALLY DISINTEGRATING ORAL EVERY 8 HOURS PRN
Status: DISCONTINUED | OUTPATIENT
Start: 2019-01-16 | End: 2019-01-17 | Stop reason: HOSPADM

## 2019-01-16 RX ORDER — FLUOXETINE HYDROCHLORIDE 20 MG/1
20 CAPSULE ORAL DAILY
Status: DISCONTINUED | OUTPATIENT
Start: 2019-01-17 | End: 2019-01-17 | Stop reason: HOSPADM

## 2019-01-16 RX ADMIN — LIDOCAINE HYDROCHLORIDE,EPINEPHRINE BITARTRATE 5 ML: 20; .005 INJECTION, SOLUTION EPIDURAL; INFILTRATION; INTRACAUDAL; PERINEURAL at 10:01

## 2019-01-16 RX ADMIN — IBUPROFEN 600 MG: 600 TABLET ORAL at 09:01

## 2019-01-16 RX ADMIN — HYDROCODONE BITARTRATE AND ACETAMINOPHEN 1 TABLET: 5; 325 TABLET ORAL at 08:01

## 2019-01-16 RX ADMIN — FLUOXETINE 20 MG: 20 CAPSULE ORAL at 09:01

## 2019-01-16 RX ADMIN — SODIUM CHLORIDE, SODIUM LACTATE, POTASSIUM CHLORIDE, AND CALCIUM CHLORIDE 1000 ML: .6; .31; .03; .02 INJECTION, SOLUTION INTRAVENOUS at 06:01

## 2019-01-16 RX ADMIN — SODIUM CHLORIDE, SODIUM LACTATE, POTASSIUM CHLORIDE, AND CALCIUM CHLORIDE: .6; .31; .03; .02 INJECTION, SOLUTION INTRAVENOUS at 06:01

## 2019-01-16 RX ADMIN — Medication: at 10:01

## 2019-01-16 RX ADMIN — Medication 12 ML/HR: at 06:01

## 2019-01-16 RX ADMIN — DOCUSATE SODIUM 200 MG: 100 CAPSULE, LIQUID FILLED ORAL at 08:01

## 2019-01-16 RX ADMIN — LIDOCAINE HYDROCHLORIDE,EPINEPHRINE BITARTRATE 5 ML: 20; .005 INJECTION, SOLUTION EPIDURAL; INFILTRATION; INTRACAUDAL; PERINEURAL at 06:01

## 2019-01-16 RX ADMIN — NICOTINE 1 PATCH: 14 PATCH, EXTENDED RELEASE TRANSDERMAL at 07:01

## 2019-01-16 RX ADMIN — HYDROCODONE BITARTRATE AND ACETAMINOPHEN 1 TABLET: 5; 325 TABLET ORAL at 04:01

## 2019-01-16 RX ADMIN — Medication 2 MILLI-UNITS/MIN: at 09:01
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
## 2019-01-16 NOTE — ANESTHESIA PROCEDURE NOTES
CSE    Patient location during procedure: OB  Start time: 1/16/2019 6:28 AM  Timeout: 1/16/2019 6:26 AM  End time: 1/16/2019 6:34 AM  Staffing  Resident/CRNA: Israel Hahn CRNA  Performed: resident/CRNA   Preanesthetic Checklist  Completed: patient identified, site marked, surgical consent, pre-op evaluation, timeout performed, IV checked, risks and benefits discussed and monitors and equipment checked  CSE  Patient position: sitting  Prep: ChloraPrep  Patient monitoring: heart rate and frequent blood pressure checks  Approach: midline  Spinal Needle  Needle type: pencil-tip   Needle gauge: 25 G  Needle length: 5 in  Epidural Needle  Injection technique: MARIA E saline  Needle type: Tuohy   Needle gauge: 18 G  Needle length: 3.5 in  Needle insertion depth: 8 cm  Location: L3-4  Needle localization: anatomical landmarks  Catheter  Catheter type: side hole  Catheter size: 20 G  Catheter at skin depth: 13 cm  Test dose: lidocaine 2% with Epi 1-to-200,000 and negative  Additional Documentation: incremental injection, negative aspiration for CSF, negative aspiration for heme and no paresthesia on injection  Assessment  Sensory level: T10   Dermatomal levels determined by pinch or prick  Intrathecal Medications:  administered: primary anesthetic mcg of    Additional Notes  +DPE 25 ga PENCAN
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
## 2019-01-16 NOTE — ANESTHESIA POSTPROCEDURE EVALUATION
"Anesthesia Post Evaluation    Patient: Wilmer Honeycutt    Procedure(s) Performed: * No procedures listed *    Final Anesthesia Type: CSE  Patient location during evaluation: labor & delivery  Patient participation: Yes- Able to Participate  Level of consciousness: awake and alert and oriented  Post-procedure vital signs: reviewed and stable  Pain management: adequate  Airway patency: patent  PONV status at discharge: No PONV  Anesthetic complications: no      Cardiovascular status: blood pressure returned to baseline  Respiratory status: unassisted, spontaneous ventilation and room air  Hydration status: euvolemic  Follow-up not needed.        Visit Vitals  /75   Pulse 72   Temp 35.8 °C (96.5 °F)   Resp 18   Ht 5' 3" (1.6 m)   Wt 91.6 kg (202 lb)   LMP 04/19/2018   SpO2 100%   Breastfeeding? No   BMI 35.78 kg/m²       Pain/Hannah Score: No Data Recorded      "

## 2019-01-16 NOTE — ANESTHESIA PREPROCEDURE EVALUATION
01/16/2019  Wilmer Honeycutt is a 23 y.o., female.    Anesthesia Evaluation    I have reviewed the Patient Summary Reports.    I have reviewed the Nursing Notes.   I have reviewed the Medications.     Review of Systems  Anesthesia Hx:  No problems with previous Anesthesia   Denies Personal Hx of Anesthesia complications.   Social:  Non-Smoker, No Alcohol Use    Hematology/Oncology:  Hematology Normal   Oncology Normal     EENT/Dental:EENT/Dental Normal   Cardiovascular:  Cardiovascular Normal Exercise tolerance: good     Pulmonary:  Pulmonary Normal    Renal/:  Renal/ Normal     Hepatic/GI:  Hepatic/GI Normal    Musculoskeletal:  Musculoskeletal Normal    Neurological:  Neurology Normal    Endocrine:  Endocrine Normal    Dermatological:  Skin Normal    Psych:   depression          Physical Exam  General:  Well nourished    Airway/Jaw/Neck:  Airway Findings: Mouth Opening: Normal Tongue: Normal  General Airway Assessment: Adult  Mallampati: II  TM Distance: Normal, at least 6 cm  Jaw/Neck Findings:     Neck ROM: Normal ROM      Dental:  Dental Findings: In tact        Mental Status:  Mental Status Findings:  Cooperative, Alert and Oriented         Anesthesia Plan  Type of Anesthesia, risks & benefits discussed:  Anesthesia Type:  CSE, epidural, general, spinal  Patient's Preference:   Intra-op Monitoring Plan: standard ASA monitors  Intra-op Monitoring Plan Comments:   Post Op Pain Control Plan:   Post Op Pain Control Plan Comments:   Induction:   IV  Beta Blocker:  Patient is not currently on a Beta-Blocker (No further documentation required).       Informed Consent: Patient understands risks and agrees with Anesthesia plan.  Questions answered. Anesthesia consent signed with patient.  ASA Score: 2     Day of Surgery Review of History & Physical: I have interviewed and examined the patient. I have  reviewed the patient's H&P dated:  There are no significant changes.  H&P update referred to the surgeon.         Ready For Surgery From Anesthesia Perspective.

## 2019-01-17 VITALS
HEIGHT: 63 IN | HEART RATE: 76 BPM | BODY MASS INDEX: 35.79 KG/M2 | SYSTOLIC BLOOD PRESSURE: 125 MMHG | DIASTOLIC BLOOD PRESSURE: 72 MMHG | WEIGHT: 202 LBS | TEMPERATURE: 97 F | OXYGEN SATURATION: 97 % | RESPIRATION RATE: 18 BRPM

## 2019-01-17 PROCEDURE — 99238 PR HOSPITAL DISCHARGE DAY,<30 MIN: ICD-10-PCS | Mod: ,,, | Performed by: OBSTETRICS & GYNECOLOGY

## 2019-01-17 PROCEDURE — 25000003 PHARM REV CODE 250: Performed by: OBSTETRICS & GYNECOLOGY

## 2019-01-17 PROCEDURE — 99238 HOSP IP/OBS DSCHRG MGMT 30/<: CPT | Mod: ,,, | Performed by: OBSTETRICS & GYNECOLOGY

## 2019-01-17 RX ORDER — IBUPROFEN 600 MG/1
600 TABLET ORAL EVERY 6 HOURS PRN
Qty: 30 TABLET | Refills: 0 | Status: SHIPPED | OUTPATIENT
Start: 2019-01-17

## 2019-01-17 RX ADMIN — HYDROCODONE BITARTRATE AND ACETAMINOPHEN 1 TABLET: 5; 325 TABLET ORAL at 02:01

## 2019-01-17 RX ADMIN — FLUOXETINE HYDROCHLORIDE 20 MG: 20 CAPSULE ORAL at 08:01

## 2019-01-17 RX ADMIN — PRENATAL VIT W/ FE FUMARATE-FA TAB 27-0.8 MG 1 TABLET: 27-0.8 TAB at 08:01

## 2019-01-17 RX ADMIN — IBUPROFEN 600 MG: 600 TABLET ORAL at 08:01

## 2019-01-17 NOTE — DISCHARGE SUMMARY
Discharge Note      SUMMARY     Admit Date: 2019    Attending Physician: Tita Castillo MD     Discharge Physician: Tita Castillo MD    Discharge Date: 2019     Admit Diagnosis:  IUP at 38 6/7 WGA  Active labor  GBS negative  Chlamydia affecting pregnancy  Depression affecting pregnancy     Final Diagnosis:   Same    Procedure:     Disposition: Home or Self Care    Condition: Stable    Hospital Course: Unremarkable and meeting discharge criteria on PPD#1    Patient Instructions:   Current Discharge Medication List      START taking these medications    Details   ibuprofen (ADVIL,MOTRIN) 600 MG tablet Take 1 tablet (600 mg total) by mouth every 6 (six) hours as needed (cramping).  Qty: 30 tablet, Refills: 0         CONTINUE these medications which have NOT CHANGED    Details   ferrous sulfate (FEOSOL) 325 mg (65 mg iron) Tab tablet Take 1 tablet (325 mg total) by mouth once daily.  Qty: 30 tablet, Refills: 3      FLUoxetine (PROZAC) 20 MG capsule Take 1 capsule (20 mg total) by mouth once daily.  Qty: 30 capsule, Refills: 11    Associated Diagnoses: Depression affecting pregnancy in second trimester, antepartum      PNV62-FA-om3-dha-epa-fish oil (PRENATAL GUMMY) 400 mcg-35 mg -25 mg-5 mg Chew Take by mouth.      hydrocortisone-pramoxine (PROCTOFOAM HC) rectal foam Place 1 applicator rectally 2 (two) times daily.  Qty: 10 g, Refills: 0    Associated Diagnoses: Hemorrhoids during pregnancy in third trimester      ondansetron (ZOFRAN) 4 MG tablet Take 8 mg by mouth 2 (two) times daily.      promethazine (PHENERGAN) 25 MG tablet Take 25 mg by mouth every 4 (four) hours.             Discharge Procedure Orders (must include Diet, Follow-up, Activity)   Discharge Procedure Orders (must include Diet, Follow-up, Activity)   Call MD for:  temperature >100.4     Call MD for:  persistent nausea and vomiting or diarrhea     Call MD for:  severe uncontrolled pain     Call MD for:  redness, tenderness,  or signs of infection (pain, swelling, redness, odor or green/yellow discharge around incision site)     Call MD for:  difficulty breathing or increased cough     Call MD for:  severe persistent headache     Call MD for:  persistent dizziness, light-headedness, or visual disturbances     Call MD for:   Order Comments: Obstetric patients: Call for decreased fetal movement, regular uterine contractions, leakage of fluid, vaginal bleeding or any other concerns  Gynecology patients: Call for incisional drainage, redness, or tenderness, abnormal vaginal bleeding or discharge or any other concerns     No dressing needed        Follow-up Information     Shaista Christy MD.    Specialty:  Obstetrics and Gynecology  Contact information:  [facility] [place] [address]  [phone]

## 2019-01-17 NOTE — NURSING
Vitals WDL. Fundus is firm without massage midline and 1 cm below umbilicus.Lochia is rubra and light.Voiding well. Eating and drinking well.Pain being managed with occasional ibuprofen. Breastfeeding on demand per baby cues 8 or more times in 24 hours. Patient is doing well with BF her baby with minimal assistance this afternoon.Used Breastfeeding Guide and reviewed first alert form, importance/ benefits of exclusive breastfeeding for 6 months, proper handling and storage of breast milk, and all resources available after leaving the hospital. Questions/ Concerns answered. Mother verbalized understanding. Both written and verbal instructions given to patient along with AVS.Patient discharged to home to private auto in wheelchair with family and baby and car seat.

## 2019-01-17 NOTE — PLAN OF CARE
Problem: Adult Inpatient Plan of Care  Goal: Plan of Care Review  Outcome: Ongoing (interventions implemented as appropriate)  Stable shift. Pt tolerated all medications and procedures well. V/S stable. NAD noted. See flow sheets for details. Assisted pt up to bathroom during shift. C/O pain in lower abdomen and perineum w/ relief from PO medications. Plan of care reviewed w/ pt; pt states understanding. Pt attentive and appropriate w/ infant during shift. Assistance needed w/ BF during shift.   Used Breastfeeding Guide and reviewed first alert form, importance/ benefits of exclusive breastfeeding for 6 months, proper handling and storage of breast milk, and all resources available after leaving the hospital. Questions/ Concerns answered. Mother verbalized understanding.   Assessed feeding during shift. Education provided on latch, positioning,milk transfer and importance of baby led feeding on cue (8 or more times daily) and use of hand expression. LATCH score complete. Reviewed the risk associated with use of pacifiers and reasons to avoid artificial nipples. Encouraged mother to use Breastfeeding Guide to track feedings and output. Questions/ Concerns answered. Mother verbalizes understanding.   Reinforced benefits of skin to skin at birth and throughout hospital stay.  Questions/ Concerns answered, Mother verbalizes understanding.    Educated mother on the risk/ concerns associated with the use of pacifiers and artificial nipples. Questions/ Concerns answered. Mother verbalized understanding.

## 2019-01-17 NOTE — DISCHARGE INSTRUCTIONS
Postpartum Discharge Instructions:    · No heavy lifting, straining, frequent rest periods  · Pelvic rest--no douching, tampons, or intercourse until released by MD  · Talk to your doctor about birth control--remember breastfeeding is not a method of birth control  · Notify MD if bleeding becomes heavier than usual and if large clots, painful cramping,or foul odor develops.   Vaginal discharge will lighten and decrease in amount gradually.    · Cleanse perineal area from front to back after urination or having a bowel movement.  · Tub soak or portable sitz bath at home.  Apply clean pad with Epifoam and Tucks to perineal area.  · Episiotomy stitches will dissolve within 2-3 weeks  · If not breastfeeding, wear tight fitting sports bra for 1 week--remove only to bathe  · Remember to keep your breast clean and dry to prevent any cracking  · Notify MD if breast become reddened,swollen, nipples bleed or crack, or fever greater than 100.4  · Notify MD of pain, swelling,  Redness, or heat developing in back of leg especially when foot is flexed toward body  · Look at incision everyday for redness, swelling, or drainage which may indicate infection  · Notify MD of pain not relieved by pain medication.  · Call MD or go to ER for any concerns      After a Vaginal Birth  After having a baby, your body may be very tired. It can take time to recover from a vaginal delivery. You may stay in the hospital or birth center from 1 to 4 days. In some cases, you may be able to go home the same day.    Right after the delivery  Your temperature and blood pressure will be taken until they are stable. A nurse or other healthcare provider will observe you as you rest. You may have afterbirth pains. These are cramps caused by the uterus shrinking. Sanitary pads are used to soak up the discharge of the uterine lining. To make sure that you arent bleeding too much, the pad will be checked. And the firmness of your uterus will be checked. To  do this, a nurse will gently push down on your stomach. If you had anesthesia, youll be watched closely until you can feel and move your toes. If you have perineal pain (pain between the vagina and anus), an ice pack can help.   care  While still in the hospital or birth center, youll learn how to hold and feed your baby. You will also be given instructions on how to care for your baby. This includes bathing and feeding.   Preparing to go home  You may be anxious to go home as soon as possible. Before you and your baby go home, a healthcare provider will check to be sure you are healthy enough to take care of your baby and yourself. Youre ready to go home when:  · You can walk to the bathroom and use the bathroom without help.  · You can eat solid food and swallow pills (if needed).  · You have no sign of infection or other health problems, including fever.   · You have adequate pain control.  · Your bleeding isn't excessive.  · You are able to care for your  and are emotionally stable.  Before leaving the hospital or birth center, youll be given written instructions for home self-care after vaginal delivery. Be sure to follow these instructions carefully. If you have questions or concerns, talk about them now.  If you have stitches  You may have received stitches in the skin near your vagina. The stitches might have closed an episiotomy (an incision that enlarges the opening of the vagina). Or you may have needed stitches to repair torn skin. Either way, your stitches should dissolve within weeks. Until then, you can help reduce discomfort, aid healing, and reduce your risk of infection by keeping the stitches clean. These tips can help:  · Gently wipe from front to back after you urinate or have a bowel movement.  · After wiping, spray warm water on the area. Or you can have a sitz bath. This means sitting in a tub with a few inches of water in it. Then pat the area dry or use a hairdryer on a  cool setting.  · Do not use soap or any solution except water on the area.  · You can take a shower unless told not to.  · Change sanitary pads at least every 2 to 4 hours.  · Place cold or heat packs on the area as directed by your healthcare providers or nurses. Keep a thin towel between the pack and your skin.  · Sit on firm seats so the stitches pull less.   follow-up  Schedule a  follow-up exam with your healthcare provider for about 6 weeks after delivery. During this exam, your uterus and vaginal area will be checked. Contact your healthcare provider if you think you or your baby are having any problems.  When to call your healthcare provider  Call your health care provider right away if you have:  · A fever of 100.4°F (38.0°C) or higher  · Bleeding that requires a new sanitary pad after an hour, or large blood clots  · Pain in your vagina that gets worse and isn't relieved with medicine  · Swelling, discharge, or increased pain from vaginal tear or episiotomy  · Burning, pain, red streaks, or lumpy areas in your breasts that may be accompanied by flu-like symptoms  · Cracks, blisters, or blood on your nipples  · Burning or pain when you urinate  · Nausea or vomiting  · Dizziness or fainting  · Feelings of extreme sadness or anxiety, or a feeling that you dont want to be with your baby  · Abdominal pain that isnt relieved with medicine  · Vaginal discharge that has a bad odor  · No bowel movement for 5 days  · Painful urination, orinability to control urination  · Redness, warmth, or pain in the lower leg  · Chest pain   Date Last Reviewed: 2015-2017 [employer]. [address] [place] [id number]. All rights reserved. This information is not intended as a substitute for professional medical care. Always follow your healthcare professional's instructions.

## 2019-01-17 NOTE — PLAN OF CARE
01/17/19 1434   Discharge Assessment   Assessment Type Discharge Planning Assessment   Confirmed/corrected address and phone number on facesheet? Yes   Assessment information obtained from? Patient   Expected Length of Stay (days) 2   Communicated expected length of stay with patient/caregiver yes   Prior to hospitilization cognitive status: Alert/Oriented;Inappropriate Behavior   Prior to hospitalization functional status: Independent   Current cognitive status: Alert/Oriented   Current Functional Status: Independent   Facility Arrived From: home   Lives With significant other   Able to Return to Prior Arrangements yes   Is patient able to care for self after discharge? Yes   Who are your caregiver(s) and their phone number(s)? Seun Dubon parent-no number given   Patient's perception of discharge disposition home or selfcare   Readmission Within the Last 30 Days no previous admission in last 30 days   Patient currently being followed by outpatient case management? No   Patient currently receives any other outside agency services? No   Equipment Currently Used at Home none   Do you have any problems affording any of your prescribed medications? No   Is the patient taking medications as prescribed? yes   Does the patient have transportation home? Yes   Transportation Anticipated family or friend will provide   Does the patient receive services at the [facility]? No   Discharge Plan A Home   Discharge Plan B Home   DME Needed Upon Discharge  none   Patient/Family in Agreement with Plan yes     No PAC needs identified at this time.

## 2019-01-18 NOTE — PLAN OF CARE
Problem: Breastfeeding  Goal: Effective Breastfeeding  Outcome: Ongoing (interventions implemented as appropriate)  Worked with mom or 1 feeding while still in L&D. Baby did well. Mom needed reassurance & assistance with positioning and alignment.

## 2019-01-18 NOTE — PLAN OF CARE
01/18/19 0740   Final Note   Assessment Type Final Discharge Note   Anticipated Discharge Disposition Home   What phone number can be called within the next 1-3 days to see how you are doing after discharge? [phone]   Hospital Follow Up  Appt(s) scheduled? Yes   Discharge plans and expectations educations in teach back method with documentation complete? Yes

## 2019-01-30 ENCOUNTER — POSTPARTUM VISIT (OUTPATIENT)
Dept: OBSTETRICS AND GYNECOLOGY | Facility: CLINIC | Age: 24
End: 2019-01-30
Payer: MEDICAID

## 2019-01-30 VITALS
DIASTOLIC BLOOD PRESSURE: 70 MMHG | HEIGHT: 63 IN | SYSTOLIC BLOOD PRESSURE: 120 MMHG | HEART RATE: 88 BPM | RESPIRATION RATE: 10 BRPM | BODY MASS INDEX: 32.78 KG/M2 | WEIGHT: 185 LBS

## 2019-01-30 DIAGNOSIS — F17.200 SMOKES TOBACCO DAILY: ICD-10-CM

## 2019-01-30 DIAGNOSIS — A63.0 ANOGENITAL WART: ICD-10-CM

## 2019-01-30 DIAGNOSIS — Z30.014 ENCOUNTER FOR INITIAL PRESCRIPTION OF INTRAUTERINE CONTRACEPTIVE DEVICE (IUD): ICD-10-CM

## 2019-01-30 PROCEDURE — 99999 PR PBB SHADOW E&M-EST. PATIENT-LVL IV: CPT | Mod: PBBFAC,,, | Performed by: NURSE PRACTITIONER

## 2019-01-30 PROCEDURE — 99214 OFFICE O/P EST MOD 30 MIN: CPT | Mod: PBBFAC,TH | Performed by: NURSE PRACTITIONER

## 2019-01-30 PROCEDURE — 99999 PR PBB SHADOW E&M-EST. PATIENT-LVL IV: ICD-10-PCS | Mod: PBBFAC,,, | Performed by: NURSE PRACTITIONER

## 2019-01-30 PROCEDURE — 59430 PR CARE AFTER DELIVERY ONLY: ICD-10-PCS | Mod: S$PBB,,, | Performed by: NURSE PRACTITIONER

## 2019-01-30 RX ORDER — PRAMOXINE HYDROCHLORIDE 10 MG/G
AEROSOL, FOAM TOPICAL 3 TIMES DAILY PRN
Qty: 15 G | Refills: 2 | Status: SHIPPED | OUTPATIENT
Start: 2019-01-30 | End: 2019-06-21

## 2019-01-30 RX ORDER — HYDROCORTISONE 2.5% 25 MG/G
CREAM TOPICAL
COMMUNITY
Start: 2018-12-10 | End: 2019-06-21

## 2019-01-30 RX ORDER — FLUOXETINE HYDROCHLORIDE 40 MG/1
40 CAPSULE ORAL DAILY
Qty: 30 CAPSULE | Refills: 2 | Status: SHIPPED | OUTPATIENT
Start: 2019-01-30 | End: 2019-06-21 | Stop reason: SDUPTHER

## 2019-01-30 NOTE — PROGRESS NOTES
SUBJECTIVE:  Wilmer Honeycutt is a 23 y.o.  female who presents for a postpartum visit. She is 2 weeks postpartum following a normal spontaneous vaginal delivery. Pregnancy complications: none and depression controlled on prozac and buspar during pregnancy. The delivery was at 38/6 gestational weeks. Postpartum course has been complicated by worsening hemorrhoids and genital warts. She is feeling anxious, stressed, fatigued, depressed, that she has no suicidal/homicidal inclination. Baby's course has been uncomplicated. Baby is feeding by breast. Bleeding thin lochia, brown and changing a thin pad 3 times a day. Bowel function is normal. Bladder function is normal. Patient is not sexually active. Contraception method is abstinence. She plans to use IUD for contraception.  Postpartum depression screening: positive.     I have fully reviewed the prenatal and intrapartum course.  The following portions of the patient's history were reviewed and updated as appropriate: allergies, current medications, past family history, past     OB History    Para Term  AB Living   1 1 1     1   SAB TAB Ectopic Multiple Live Births         0 1      # Outcome Date GA Lbr Fish/2nd Weight Sex Delivery Anes PTL Lv   1 Term 19 38w6d / 01:40 3.062 kg (6 lb 12 oz) M Vag-Spont EPI N JORI          ROS:  GENERAL: No fever, chills, fatigability or weight loss.  VULVAR: No pain, no lesions and no itching.  VAGINAL: No relaxation, no itching, no discharge, no abnormal bleeding and no lesions.  RECTAL: ++ pain, itching, irritation, rectal bleeding with BM  ABDOMEN: No abdominal pain. Denies nausea. Denies vomiting. No diarrhea. No constipation  BREAST: Denies pain. No lumps. No discharge.  URINARY: No incontinence, no nocturia, no frequency and no dysuria.  CARDIOVASCULAR: No chest pain. No shortness of breath. No leg cramps.  NEUROLOGICAL: No headaches. No vision changes.    OBJECTIVE:   /70   Pulse 88   Resp 10   Ht  "5' 3" (1.6 m)   Wt 83.9 kg (185 lb)   LMP 04/19/2018 The patient is currently breastfeeding.Body mass index is 32.77 kg/m².  Date of last Pap smear: 6/1/2018    General Appearance: alert, well appearing, in no apparent distress, oriented to person, place and time, anxious, appears depressed  Head: normocephalic, atraumatic  Abdomen: benign non-tender, without masses or organomegaly palpable  Pelvic: EXTERNAL GENITALIA POSTPARTUM: normal, well-healed, without lesions or masses, VAGINA POSTPARTUM: normal, well-healed, physiologic discharge, without lesions, RECTAL POSTPARTUM: hemorrhoids (non thrombosed without active bleeding), +anal warts  Extremities: no redness or tenderness in the calves or thighs, no edema  Skin: normal coloration and turgor, no rashes  Neurological: alert, oriented, normal speech, no focal findings or movement disorder noted  Psych: good grooming, good insight, normal perception, normal reasoning, normal speech pattern and content and normal thought patterns agitation and flattened affect         ASSESSMENT:    ICD-10-CM ICD-9-CM   1. Routine postpartum follow-up Z39.2 V24.2   2. Encounter for initial prescription of intrauterine contraceptive device (IUD) Z30.014 V25.02   3. Postpartum depression, postpartum condition F53.0 648.44     311   4. Postpartum hemorrhoids O87.2 671.84     455.6   5. Anogenital wart A63.0 078.11   6. Smokes tobacco daily Z72.0 305.1     PLAN:    1. Routine postpartum follow-up  See orders and Patient Instructions  Advised to stop smoking  Contraceptive counseling for IUD  Anticipatory guidelines discussed and emergency precautions given, verbalized understanding.     2. Encounter for initial prescription of intrauterine contraceptive device (IUD)  Mirena/IUD ordered, forms filled out today    3. Postpartum depression, postpartum condition  Prozac increased from 20mg to 40mg  Reviewed concept of depression as biochemical imbalance of neurotransmitters and rationale " for treatment. Instructed patient to contact office or on-call physician promptly should condition worsen or any new symptoms appear and provided on-call telephone numbers  - FLUoxetine 40 MG capsule; Take 1 capsule (40 mg total) by mouth once daily.  Dispense: 30 capsule; Refill: 2    4. Postpartum hemorrhoids  Discussed the diagnosis with the patient, including plans for treatment and expected course.  Distributed educational material.  Discussed symptomatic and preventative measures regarding hemorrhoidal disease.  Recommended fiber supplementation.  Topical corticosteroid per medication orders.  Anusol suppositories as needed.  Short term use only.  Told to seek care immediately if bleeding becomes heavy.  - pramoxine 1 % Foam; Place rectally 3 (three) times daily as needed.  Dispense: 15 g; Refill: 2    5. Anogenital wart  No treatment today, will reassess at next visit    6. Smokes tobacco daily  Discussed current use pattern.  Asked patient to inform me when they set a quit date.  I spent approximately a few minutes counseling the patient.   Ready to quit: No  Counseling given: Yes      Follow up in: 4 weeks or as needed.

## 2019-01-30 NOTE — PATIENT INSTRUCTIONS
"  Understanding Postpartum Depression  Youve just had a baby. You know you should be excited and happy. But instead you find yourself crying for no reason. You may have trouble coping with your daily tasks. You feel sad, tired, and hopeless most of the time. You may even feel ashamed or guilty. But what youre going through is not your fault and you can feel better. Talk to your healthcare provider. He or she can help.    What is depression?  Depression is a mood disorder that affects the way you think and feel. The most common symptom is a feeling of deep sadness. You may also feel as if you just cant cope with life. Other symptoms include:  · Gaining or losing a lot of weight  · Sleeping too much or too little  · Feeling tired all the time  · Feeling restless  · Crying a lot  · Having too little or too much appetite.  · Withdrawing from friends and family  · Having headaches, aches and pains, or stomach problems that won't go away.  · Fears of harming your baby  · Lack of interest in your baby  · Feeling worthless or guilty  · No longer finding pleasure in things you used to  · Having trouble thinking clearly or making decisions  · Thinking about death or suicide   Depression after childbirth  You may be weepy and tired right after giving birth. These feelings are normal. Theyre sometimes called the baby blues. These blues go away after 2 or 3 weeks. However, postpartum (meaning after birth) depression lasts much longer and is more severe than the "baby blues." It can make you feel sad and hopeless. You may also fear that your baby will be harmed and worry about being a bad mother.  What causes postpartum depression?  The exact cause of postpartum depression is unknown. Changes in brain chemistry or structure are believed to play a big role in depression. It may be due to changes in your hormones during and after childbirth. You may also be tired from caring for your baby and adjusting to being a mother. " All these factors may make you feel depressed. In some cases, your genes may also play a role.  Depression can be treated  The good news is that there are many ways to treat postpartum depression. Talking to your healthcare provider is the first step toward feeling better.  Resources  · National [place] of Mental Slwfgw392-532-9340lpq.[facility].nih.gov  · National [place] on Mental Ywjkuty308-482-3282bqs.fadia.org  · Mental Health Dotafuh060-970-1133hit.[facility].org  · National Suicide Ibndsux308-519-6581 (800-SUICIDE)   Date Last Reviewed: 8/16/2015  © 0294-5688 [employer]. [address] [place] [address], [place] [address]. All rights reserved. This information is not intended as a substitute for professional medical care. Always follow your healthcare professional's instructions.        Treating Hemorrhoids: Self-Care    Follow your healthcare providers advice about caring for your hemorrhoids at home. Some treatments help relieve symptoms right away. Others involve making changes in your diet and exercise habits. These can help ease constipation and prevent hemorrhoid symptoms from coming back.  Relieving symptoms  Your healthcare provider may prescribe anti-inflammatory medicine to help ease your symptoms. The following tips will also help relieve pain and swelling.  · Take sitz baths. Taking a sitz bath means sitting in a few inches of warm bath water. Soaking for 10 minutes twice a day can provide welcome relief from painful hemorrhoids. It can also help the area stay clean.  · Develop good bowel habits. Use the bathroom when you need to. Dont ignore the urge to move your bowels. This can lead to constipation, hard stools, and straining. Also, dont read while on the toilet. Sit only as long as needed. Wipe gently with soft, unscented toilet tissue or baby wipes.  · Use ice packs. Placing an ice pack on a thrombosed external hemorrhoid can help relieve pain right away. It will also help reduce the blood  clot. Use the ice for 15 to 20 minutes at a time. Keep a cloth between the ice and your skin to prevent skin damage.  · Use other measures. Laxatives and enemas can help ease constipation. But use them only on your healthcare providers advice. For symptom relief, try using cotton pads soaked in witch hazel. These are available at most drugstores. Over-the-counter hemorrhoid ointments and petroleum jelly can also provide relief.  Add fiber to your diet  Adding fiber to your diet can help relieve constipation by making stools softer and easier to pass. To increase your fiber intake, your healthcare provider may recommend a bulking agent, such as psyllium. This is a high-fiber supplement available at most grocery stores and drugstores. Eating more fiber-rich foods will also help. There are two types of fiber:  · Insoluble fiber is the main ingredient in bulking agents. Its also found in foods such as wheat bran, whole-grain breads, fresh fruits, and vegetables.  · Soluble fiber is found in foods such as oat bran. Although soluble fiber is good for you, it may not ease constipation as much as foods high in insoluble fiber.  Drink more water  Along with a high-fiber diet, drinking more water can help ease constipation. This is because insoluble fiber absorbs water, making stools soft and bulky. Be sure to drink plenty of water throughout the day. Drinking fruit juices, such as prune juice or apple juice, can also help prevent constipation.  Get more exercise  Regular exercise aids digestion and helps prevent constipation. Its also great for your health. So talk with your healthcare provider about starting an exercise program. Low-impact activities, such as swimming or walking, are good places to start. Take it easy at first. And remember to drink plenty of water when you exercise.  High-fiber foods  High-fiber foods offer many benefits. By making your stools softer, they help heal and prevent swollen hemorrhoids. They  may also help reduce the risk of colon and rectal cancer. Best of all, theyre usually low in calories and taste great. Here are some examples of fiber-rich foods.  · Whole grains, such as wheat bran, corn bran, and brown rice.  · Vegetables, especially carrots, broccoli, cabbage, and peas.  · Fruits, such as apples, bananas, raisins, peaches, and pears.  · Nuts and legumes, especially peanuts, lentils, and kidney beans.  Easy ways to add fiber  The tips below offer some simple ways to add more high-fiber foods to your meals.  · Start your day with a high-fiber breakfast. Eat a wheat bran cereal along with a sliced banana. Or, try peanut butter on whole-wheat toast.  · Eat carrot sticks for snacks. Theyre easy to prepare, taste great, and are low in calories.  · Use whole-grain breads instead of white bread for sandwiches.  · Eat fruits for treats. Try an apple and some raisins instead of a candy bar.   Date Last Reviewed: 7/1/2016  © 9951-9842 [employer]. [address] [place] [address]. All rights reserved. This information is not intended as a substitute for professional medical care. Always follow your healthcare professional's instructions.        Hemorrhoids    Hemorrhoids are swollen and inflamed veins inside the rectum and near the anus. The rectum is the last several inches of the colon. The anus is the passage between the rectum and the outside of the body.  Causes  The veins can become swollen due to increased pressure in them. This is most often caused by:  · Chronic constipation or diarrhea  · Straining when having a bowel movement  · Sitting too long on the toilet  · A low-fiber diet  · Pregnancy  Symptoms  · Bleeding from the rectum (this may be noticeable after bowel movements)  · Lump near the anus  · Itching around the anus  · Pain around the anus  There are different types of hemorrhoids. Depending on the type you have and the severity, you may be able to treat yourself  at home. In some cases, a procedure may be the best treatment option. Your healthcare provider can tell you more about this, if needed.  Home care  General care  · To get relief from pain or itching, try:  ¨ Topical products. Your healthcare provider may prescribe or recommend creams, ointments, or pads that can be applied to the hemorrhoid. Use these exactly as directed.  ¨ Medicines. Your healthcare provider may recommend stool softeners, suppositories, or laxatives to help manage constipation. Use these exactly as directed.  ¨ Sitz baths. A sitz bath involves sitting in a few inches of warm bath water. Be careful not to make the water so hot that you burn yourself--test it before sitting in it. Soak for about 10 to 15 minutes a few times a day. This may help relieve pain.  Tips to help prevent hemorrhoids  · Eat more fiber. Fiber adds bulk to stool and absorbs water as it moves through your colon. This makes stool softer and easier to pass.  ¨ Increase the fiber in your diet with more fiber-rich foods. These include fresh fruit, vegetables, and whole grains.  ¨ Take a fiber supplement or bulking agent, if advised to by your provider. These include products such as psyllium or methylcellulose.  · Drink plenty of water, if directed to by your provider. This can help keep stool soft.  · Be more active. Frequent exercise aids digestion and helps prevent constipation. It may also help make bowel movements more regular.  · Dont strain during bowel movements. This can make hemorrhoids more likely. Also, dont sit on the toilet for long periods of time.  Follow-up care  Follow up with your healthcare provider, or as advised. If a culture or imaging tests were done, you will be notified of the results when they are ready. This may take a few days or longer.  When to seek medical advice  Call your healthcare provider right away if any of these occur:  · Increased bleeding from the rectum  · Increased pain around the  rectum or anus  · Weakness or dizziness  Call 911  Call 911 or return to the emergency department right away if any of these occur:  · Trouble breathing or swallowing  · Fainting or loss of consciousness  · Unusually fast heart rate  · Vomiting blood  · Large amounts of blood in stool  Date Last Reviewed: 6/22/2015  © 4917-9187 [employer]. [address] [place] [address], [place] [address]. All rights reserved. This information is not intended as a substitute for professional medical care. Always follow your healthcare professional's instructions.        Treating Genital Warts  Warts sometimes go away on their own, remain unchanged, or increase in size and number. Depending on where the warts are, some treatments may work better than others. However, even though these treatments may remove the wart, the virus that caused the wart usually remains in the skin.  Medicines     Medicines can be applied to break warts apart.   Prescription creams and gels can be applied to warts and surrounding skin. Some prompt your immune system to rally against HPV (human papillomavirus), the virus that causes genital warts. Others are caustic agents that destroy warts. Medicines can be applied at the health care provider's office or at home. Often, more than one dose is needed. These treatments sometimes cause skin rashes. Talk to your healthcare provider about possible side effects.     Wart removal     Warts may be removed using a heated wire loop.   Warts can be removed in a number of ways. These include freezing, heat (cautery), lasers, and surgery. These procedures are done by your regular healthcare provider or a specialist. Before treatment, you may receive local anesthesia to numb the area. The number of treatments depends on how many warts are being removed. Your healthcare provider can give you more details.     Other treatment options for genital warts  As more is learned about HPV, new treatments are being developed  to help the body defend itself. Your healthcare provider can tell you more about treatments that may someday be available. There is also a vaccine that can prevent HPV in young men and women before you even have the virus. Your healthcare provider can tell you more.   Date Last Reviewed: 1/1/2017  © 7976-0306 [employer]. [address] [place] [address], [place] [address]. All rights reserved. This information is not intended as a substitute for professional medical care. Always follow your healthcare professional's instructions.        Genital Warts (Condyloma)     Ask your healthcare provider about the HPV vaccine.     Genital warts (also called condyloma) are caused by a virus that is often transmitted sexually. This virus is known as human papillomavirus  (HPV). The warts are often so tiny that they are hard to see. But even tiny warts can cause big trouble, especially in women. Genital warts can cause cell changes that can lead to genital cancers such as cervical cancer. Warts can even be passed to babies during childbirth.  Note: Latex condoms may help protect against genital warts. But condoms dont cover all the areas that can get infected. That means condoms may not protect you completely.   What are the symptoms of genital warts?  Genital warts can be flat. Or they can be raised and look like tiny cauliflowers. They can grow on the penis, vagina, or cervix. They can also grow in and around the rectum, and even in the throat. You can have the virus for many months before the warts appear. Or you may have the virus but never develop visible warts. Once warts form, they are often too small to be noticed. Thats why you need regular exams by your healthcare provider. He or she can find tiny warts and can check your cells for changes that mean the virus is present.  What is the treatment of genital warts?  Genital warts tend to grow with time. The smaller the warts are, the easier they are to remove. So  dont delay. Warts are most often removed with chemicals. Sometimes theyre frozen off with liquid nitrogen. Warts may also be removed with heat or laser. More than one treatment may be needed. Never try to treat genital warts yourself.  How are genital warts prevented?  To prevent genital warts, consider getting vaccinated. Your healthcare provider can tell you if the vaccine is right for you. Also know your partners sexual history. Even if someone doesnt have visible warts, he or she can still transmit the virus. Protect yourself by using latex condoms. And get regular medical exams. In women, regular Pap smears can help detect changes caused by genital warts and catch any signs of cervical cancer early. Also, ask your healthcare provider about the HPV vaccine.  Resources  American Social Health Association STD Hotline   944.441.9624   www.ashastd.org  Centers for Disease Control and Prevention   924.543.3927  www.cdc.gov/std   Date Last Reviewed: 1/1/2017 © 2000-2017 [employer]. [address] [place] [address], [place] [address]. All rights reserved. This information is not intended as a substitute for professional medical care. Always follow your healthcare professional's instructions.

## 2019-01-31 NOTE — TELEPHONE ENCOUNTER
----- Message from Shruthi Tobar MA sent at 9/17/2018  2:18 PM CDT -----  Contact: self  Wilmer Honeycutt  MRN: [id number]  Home Phone      [phone]  Work Phone      Not on file.  Mobile          [phone]    Patient Care Team:  Primary Doctor No as PCP - General  Tita Castillo MD (Obstetrics and Gynecology)  OB? Yes, 21w4d  What phone number can you be reached at?   [phone]  Message:   Needs to know what is safe and not safe to do while pregnant.       Medical Records reviewed by  and sent to Scanning Department.

## 2019-02-13 ENCOUNTER — TELEPHONE (OUTPATIENT)
Dept: OBSTETRICS AND GYNECOLOGY | Facility: CLINIC | Age: 24
End: 2019-02-13

## 2019-02-13 ENCOUNTER — TELEPHONE (OUTPATIENT)
Dept: OBSTETRICS AND GYNECOLOGY | Facility: HOSPITAL | Age: 24
End: 2019-02-13

## 2019-02-13 NOTE — TELEPHONE ENCOUNTER
----- Message from Shruthi Tobar MA sent at 2/13/2019  1:19 PM CST -----  Contact: self  Wilmer Honeycutt  MRN: [id number]  Home Phone      [phone]  Work Phone      Not on file.  Mobile          [phone]    Patient Care Team:  Primary Doctor No as PCP - General  Tita Castillo MD (Obstetrics and Gynecology)  OB? No  What phone number can you be reached at?  [phone]  Message:   Patient has appt for 02/28, but is needing something sooner.  Stated the problem she spoke to Dr Zuniga about is getting worse.

## 2019-02-13 NOTE — TELEPHONE ENCOUNTER
Patient states she discussed at previous visit that she was having genital wart breakouts from HPV. States it is getting worse and has more clusters than before. States she has been using the foam that was prescribed and has had no relief. Patient requesting sooner appointment. Offered multiple appointment times tomorrow. Scheduled.

## 2019-02-13 NOTE — TELEPHONE ENCOUNTER
Jennifer Baker calls from [facility] to send patient over to clinic for problems with milk supply. Mom no shows for her appt. Tried calling to offer assistance. NA @ this time. Called [facility] to let them know mom did not bring baby for feeding help.

## 2019-02-14 ENCOUNTER — POSTPARTUM VISIT (OUTPATIENT)
Dept: OBSTETRICS AND GYNECOLOGY | Facility: CLINIC | Age: 24
End: 2019-02-14
Payer: MEDICAID

## 2019-02-14 VITALS
SYSTOLIC BLOOD PRESSURE: 120 MMHG | WEIGHT: 178.63 LBS | RESPIRATION RATE: 18 BRPM | HEIGHT: 63 IN | BODY MASS INDEX: 31.65 KG/M2 | HEART RATE: 78 BPM | DIASTOLIC BLOOD PRESSURE: 80 MMHG

## 2019-02-14 DIAGNOSIS — A63.0 CONDYLOMA ACUMINATA: Primary | ICD-10-CM

## 2019-02-14 PROCEDURE — 99999 PR PBB SHADOW E&M-EST. PATIENT-LVL III: CPT | Mod: PBBFAC,,, | Performed by: OBSTETRICS & GYNECOLOGY

## 2019-02-14 PROCEDURE — 99213 OFFICE O/P EST LOW 20 MIN: CPT | Mod: S$PBB,,, | Performed by: OBSTETRICS & GYNECOLOGY

## 2019-02-14 PROCEDURE — 99213 PR OFFICE/OUTPT VISIT, EST, LEVL III, 20-29 MIN: ICD-10-PCS | Mod: S$PBB,,, | Performed by: OBSTETRICS & GYNECOLOGY

## 2019-02-14 PROCEDURE — 99213 OFFICE O/P EST LOW 20 MIN: CPT | Mod: PBBFAC | Performed by: OBSTETRICS & GYNECOLOGY

## 2019-02-14 PROCEDURE — 99999 PR PBB SHADOW E&M-EST. PATIENT-LVL III: ICD-10-PCS | Mod: PBBFAC,,, | Performed by: OBSTETRICS & GYNECOLOGY

## 2019-02-14 RX ORDER — IMIQUIMOD 12.5 MG/.25G
CREAM TOPICAL
Qty: 16 PACKET | Refills: 6 | Status: SHIPPED | OUTPATIENT
Start: 2019-02-14 | End: 2019-06-14

## 2019-02-14 NOTE — PROGRESS NOTES
Subjective:    Patient ID: Wilmer Honeycutt is a 23 y.o. y.o. female    Chief Complaint:   Chief Complaint   Patient presents with    Postpartum Follow-up     4 weeks PP; vaginal delivery.    Genital Warts     outbreak during pregnancy and has worsened since delivery.        History of Present Illness:  Wilmer presents today for evaluation of perirectal warts that have gotten worse during her pregnancy and following delivery.      Review of Systems   Constitutional: Negative for activity change, appetite change, chills, diaphoresis, fatigue, fever and unexpected weight change.   Respiratory: Negative for cough, shortness of breath and wheezing.    Cardiovascular: Negative for chest pain, palpitations and leg swelling.   Gastrointestinal: Negative for abdominal pain, blood in stool, constipation, diarrhea, nausea and vomiting.   Endocrine: Negative for diabetes, hair loss, hot flashes, hyperthyroidism and hypothyroidism.   Genitourinary: Negative for decreased libido, dyspareunia, dysuria, flank pain, frequency, genital sores, hematuria, menorrhagia, menstrual problem, pelvic pain, urgency, vaginal bleeding, vaginal discharge, vaginal pain, urinary incontinence, postcoital bleeding and vaginal odor.   Hematological: Negative for adenopathy. Does not bruise/bleed easily.   Psychiatric/Behavioral: Negative for sleep disturbance. The patient is not nervous/anxious.    Breast: Negative for mastodynia and nipple discharge          Objective:    Vital Signs:  Vitals:    02/14/19 1137   BP: 120/80   Pulse: 78   Resp: 18       Physical Exam:  General:  alert,normal appearing gravid female   Skin:  Skin color, texture, turgor normal. No rashes or lesions   Abdomen: soft, non-tender. Bowel sounds normal. No masses,  no organomegaly   Pelvis: External genitalia: normal general appearance enrico-rectal condylomata. Nothing palpated internally. No lesions on the labia.  Urinary system: urethral meatus normal, bladder  nontender  Vaginal: normal mucosa without prolapse or lesions No condyloma palpable internally  Cervix: normal appearance  Uterus: normal single, nontender  Adnexa: normal bimanual exam         Assessment:      1. Condyloma acuminata          Plan:      Condyloma acuminata    Other orders  -     imiquimod (ALDARA) 5 % cream; Apply topically 4 (four) times a week. Apply for 4 consecutive days, then wait 3 days and repeat as directed  Dispense: 16 packet; Refill: 6

## 2019-02-28 ENCOUNTER — POSTPARTUM VISIT (OUTPATIENT)
Dept: OBSTETRICS AND GYNECOLOGY | Facility: CLINIC | Age: 24
End: 2019-02-28
Payer: MEDICAID

## 2019-02-28 VITALS
HEART RATE: 80 BPM | HEIGHT: 63 IN | DIASTOLIC BLOOD PRESSURE: 78 MMHG | RESPIRATION RATE: 10 BRPM | SYSTOLIC BLOOD PRESSURE: 120 MMHG | WEIGHT: 182 LBS | BODY MASS INDEX: 32.25 KG/M2

## 2019-02-28 DIAGNOSIS — Z30.42 ENCOUNTER FOR DEPO-PROVERA CONTRACEPTION: ICD-10-CM

## 2019-02-28 DIAGNOSIS — Z30.014 ENCOUNTER FOR INITIAL PRESCRIPTION OF INTRAUTERINE CONTRACEPTIVE DEVICE (IUD): ICD-10-CM

## 2019-02-28 DIAGNOSIS — A63.0 ANAL CONDYLOMATA: ICD-10-CM

## 2019-02-28 LAB
B-HCG UR QL: NEGATIVE
CTP QC/QA: YES

## 2019-02-28 PROCEDURE — 81025 URINE PREGNANCY TEST: CPT | Mod: PBBFAC | Performed by: OBSTETRICS & GYNECOLOGY

## 2019-02-28 PROCEDURE — 99213 OFFICE O/P EST LOW 20 MIN: CPT | Mod: PBBFAC | Performed by: OBSTETRICS & GYNECOLOGY

## 2019-02-28 PROCEDURE — 0503F PR POSTPARTUM CARE VISIT: ICD-10-PCS | Mod: S$PBB,,, | Performed by: OBSTETRICS & GYNECOLOGY

## 2019-02-28 PROCEDURE — 99999 PR PBB SHADOW E&M-EST. PATIENT-LVL III: ICD-10-PCS | Mod: PBBFAC,,, | Performed by: OBSTETRICS & GYNECOLOGY

## 2019-02-28 PROCEDURE — 99999 PR PBB SHADOW E&M-EST. PATIENT-LVL III: CPT | Mod: PBBFAC,,, | Performed by: OBSTETRICS & GYNECOLOGY

## 2019-02-28 PROCEDURE — 0503F POSTPARTUM CARE VISIT: CPT | Mod: S$PBB,,, | Performed by: OBSTETRICS & GYNECOLOGY

## 2019-02-28 RX ORDER — MEDROXYPROGESTERONE ACETATE 150 MG/ML
150 INJECTION, SUSPENSION INTRAMUSCULAR
Qty: 1 ML | Refills: 0 | Status: SHIPPED | OUTPATIENT
Start: 2019-02-28 | End: 2019-05-16

## 2019-02-28 NOTE — PROGRESS NOTES
CC: Post-partum follow-up    Wilmer Honeycutt is a 23 y.o. female  presents for a postpartum visit.  She is status post   6 weeks ago.  Her hospitalization was not complicated.  She is not breastfeeding.  She desires IUD for contraception.  She denies postpartum depression, but is upset with being overweight She and the baby are doing well.  No pain.  No fever.   No bowel / bladder complaints.       Her last pap was 2018      ROS:  GENERAL: No fever, chills, fatigability.  VULVAR: No pain, no lesions and no itching.  VAGINAL: No relaxation, no itching, no discharge, no abnormal bleeding and no lesions.  ABDOMEN: No abdominal pain. Denies nausea. Denies vomiting. No diarrhea. No constipation  BREAST: Denies pain. No lumps. No discharge.  URINARY: No incontinence, no nocturia, no frequency and no dysuria.  CARDIOVASCULAR: No chest pain. No shortness of breath. No leg cramps.  NEUROLOGICAL: No headaches. No vision changes.    Past Medical History:   Diagnosis Date    Anemia      History reviewed. No pertinent surgical history.  Review of patient's allergies indicates:  No Known Allergies    Current Outpatient Medications:     FLUoxetine 40 MG capsule, Take 1 capsule (40 mg total) by mouth once daily., Disp: 30 capsule, Rfl: 2    imiquimod (ALDARA) 5 % cream, Apply topically 4 (four) times a week. Apply for 4 consecutive days, then wait 3 days and repeat as directed, Disp: 16 packet, Rfl: 6    pramoxine 1 % Foam, Place rectally 3 (three) times daily as needed., Disp: 15 g, Rfl: 2    PROCTOZONE-HC 2.5 % rectal cream, , Disp: , Rfl:     ferrous sulfate (FEOSOL) 325 mg (65 mg iron) Tab tablet, Take 1 tablet (325 mg total) by mouth once daily., Disp: 30 tablet, Rfl: 3    hydrocortisone-pramoxine (PROCTOFOAM HC) rectal foam, Place 1 applicator rectally 2 (two) times daily., Disp: 10 g, Rfl: 0    ibuprofen (ADVIL,MOTRIN) 600 MG tablet, Take 1 tablet (600 mg total) by mouth every 6 (six) hours as  needed (cramping)., Disp: 30 tablet, Rfl: 0    ondansetron (ZOFRAN) 4 MG tablet, Take 8 mg by mouth 2 (two) times daily., Disp: , Rfl:     PNV62-FA-om3-dha-epa-fish oil (PRENATAL GUMMY) 400 mcg-35 mg -25 mg-5 mg Chew, Take by mouth., Disp: , Rfl:     promethazine (PHENERGAN) 25 MG tablet, Take 25 mg by mouth every 4 (four) hours., Disp: , Rfl:       Vitals:    19 1215   BP: 120/78   Pulse: 80   Resp: 10         EXTERNAL GENITALIA POSTPARTUM: normal, well-healed, without lesions or masses, VAGINA POSTPARTUM: normal, well-healed, physiologic discharge, without lesions, CERVIX POSTPARTUM: normal, well-healed, without lesions, UTERUS POSTPARTUM: normal size, well involuted, firm, non-tender, ADNEXA POSTPARTUM: no masses palpable and nontender  Rectum: numerous anal condyloma    Assessment:    1. Normal postpartum exam  2. Doing well S/P    3. Contraception management  4. Anal condyloma       Plan:    1. Routine follow up.  2. Instructions / precautions reviewed  3. Contraceptive counseling wants IUD with DepoProvera in interim  4. May resume normal activities  5. Return: for IUD insertion when available.  6. Requests colorectal referral for evaluation and treatment of anal condoloma

## 2019-03-04 NOTE — PROGRESS NOTES
Medroxyprogesterone 150 mg / 1ml injection given in the right hip at 1:46 pm on 02/28/2019   LOT MS 014A9   EXP 12/2020  Next window is 05/16/2019 to 05/30/2019  Tung Rogers PharmD.

## 2019-03-04 NOTE — PROGRESS NOTES
Medroxyprogesterone 150 mg / 1ml injection given in the right hip at 1:46 pm 02/28/2019 LOT [id number] EXP 12/2020  Next window is 05/16/2019 to 05/30/2019  Tung Rogers PharmD.

## 2019-03-08 ENCOUNTER — PATIENT MESSAGE (OUTPATIENT)
Dept: OBSTETRICS AND GYNECOLOGY | Facility: CLINIC | Age: 24
End: 2019-03-08

## 2019-03-10 ENCOUNTER — HOSPITAL ENCOUNTER (EMERGENCY)
Facility: HOSPITAL | Age: 24
Discharge: HOME | End: 2019-03-10
Attending: SURGERY
Payer: MEDICAID

## 2019-03-10 VITALS
BODY MASS INDEX: 32.24 KG/M2 | RESPIRATION RATE: 18 BRPM | WEIGHT: 182 LBS | HEART RATE: 92 BPM | OXYGEN SATURATION: 97 % | SYSTOLIC BLOOD PRESSURE: 116 MMHG | TEMPERATURE: 97 F | DIASTOLIC BLOOD PRESSURE: 61 MMHG

## 2019-03-10 DIAGNOSIS — J00 ACUTE NASOPHARYNGITIS: Primary | ICD-10-CM

## 2019-03-10 LAB
DEPRECATED S PYO AG THROAT QL EIA: NEGATIVE
INFLUENZA A, MOLECULAR: NEGATIVE
INFLUENZA B, MOLECULAR: NEGATIVE
SPECIMEN SOURCE: NORMAL

## 2019-03-10 PROCEDURE — 63600175 PHARM REV CODE 636 W HCPCS: Performed by: SURGERY

## 2019-03-10 PROCEDURE — 87502 INFLUENZA DNA AMP PROBE: CPT

## 2019-03-10 PROCEDURE — 99284 EMERGENCY DEPT VISIT MOD MDM: CPT

## 2019-03-10 PROCEDURE — 96372 THER/PROPH/DIAG INJ SC/IM: CPT | Mod: 59

## 2019-03-10 PROCEDURE — 87081 CULTURE SCREEN ONLY: CPT

## 2019-03-10 PROCEDURE — 87880 STREP A ASSAY W/OPTIC: CPT

## 2019-03-10 RX ORDER — METHYLPREDNISOLONE SOD SUCC 125 MG
125 VIAL (EA) INJECTION
Status: COMPLETED | OUTPATIENT
Start: 2019-03-10 | End: 2019-03-10

## 2019-03-10 RX ORDER — METHYLPREDNISOLONE 4 MG/1
TABLET ORAL
Qty: 1 PACKAGE | Refills: 0 | Status: SHIPPED | OUTPATIENT
Start: 2019-03-10 | End: 2019-06-21

## 2019-03-10 RX ORDER — AZITHROMYCIN 250 MG/1
TABLET, FILM COATED ORAL
Qty: 6 TABLET | Refills: 0 | Status: SHIPPED | OUTPATIENT
Start: 2019-03-10 | End: 2019-03-26

## 2019-03-10 RX ADMIN — METHYLPREDNISOLONE SODIUM SUCCINATE 125 MG: 125 INJECTION, POWDER, FOR SOLUTION INTRAMUSCULAR; INTRAVENOUS at 04:03

## 2019-03-10 NOTE — ED PROVIDER NOTES
[facility]                                                 Chief Complaint  23 y.o. female with Sore Throat    History of Present Illness  Wilmer Honeycutt presents to the emergency room with sore throat today  Patient has mild sore throat with no fever, tonsillitis on ER evaluation today  Patient has no stridor, no drooling, normal phonation, normal swallowing  Patient has no cough or cold, no wheezing, no shortness of breath reported  Patient has no nausea vomiting or diarrhea, does not look toxic or dehydrated    The history is provided by the patient   device was not used during this ER visit  History reviewed. No pertinent surgical history.   No Known Allergies     Review of Systems and Physical Exam      Review of Systems  -- Constitution - no fever, denies fatigue, no weakness, no chills  -- Eyes - no tearing or redness, no visual disturbance  -- Ear, Nose - no tinnitus or earache, no nasal congestion or discharge  -- Mouth,Throat - sore throat, no toothache, normal voice, normal swallowing  -- Respiratory - denies cough and congestion, no shortness of breath, no OROZCO  -- Cardiovascular - denies chest pain, no palpitations, denies claudication  -- Gastrointestinal - denies abdominal pain, nausea, vomiting, or diarrhea  -- Genitourinary - no dysuria, no hematuria, no flank pain, no bladder pain  -- Musculoskeletal - denies back pain, negative for trauma or injury  -- Neurological - no headache, denies weakness or seizure; no LOC  -- Skin - denies pallor, rash, or changes in skin. no hives or welts noted    Vital Signs  Her oral temperature is 96.9 °F (36.1 °C).   Her blood pressure is 116/61 and her pulse is 92.   Her respiration is 18 and oxygen saturation is 97%.     Physical Exam  -- Nursing note and vitals reviewed  -- Constitutional: Appears well-developed and well-nourished  -- Head: Atraumatic. Normocephalic. No obvious abnormality  -- Eyes: Pupils are equal  and reactive to light. Normal conjunctiva and lids  -- Nose: Nose normal in appearance, nares grossly normal. No discharge  -- Throat: mild posterior oropharnyx erythema with tonsillitis     -- Ears: External ears and TM normal bilaterally. Normal hearing and no drainage  -- Neck: Normal range of motion. Neck supple. No masses, trachea midline  -- Cardiac: Normal rate, regular rhythm and normal heart sounds  -- Pulmonary: Normal respiratory effort, breath sounds clear to auscultation  -- Abdominal: Soft, no tenderness. Normal bowel sounds. Normal liver edge  -- Musculoskeletal: Normal range of motion, no effusions. Joints stable   -- Neurological: No focal deficits. Showed good interaction with staff  -- Skin: Warm and dry. No evidence of rash or cellulitis    Emergency Room Course      Treatment and Evaluation  -- the patient tested negative for influenza A in the emergency room today  -- The strep screen was negative  --  mg Solumedrol given today in the ER     Diagnosis  -- The encounter diagnosis was Acute nasopharyngitis.    Disposition and Plan  -- Disposition: home  -- Condition: stable  -- Follow-up: Patient to follow up with MD in 1-2 days.  -- I advised the patient that we have found no life threatening condition today  -- At this time, I believe the patient is clinically stable for discharge.   -- The patient acknowledges that close follow up with a MD is required   -- Patient agrees to comply with all instruction and direction given in the ER    This note is dictated on M*Modal word recognition program.  There are word recognition mistakes that are occasionally missed on review.          Bennie Brumfield MD  03/10/19 2042

## 2019-03-10 NOTE — ED NOTES
Discharged to home/self care.    - Condition at discharge: Good  - Mode of Discharge: Ambulatory  - The patient left the ED accompanied by infant.  - The discharge instructions were discussed with the patient.  - She states an understanding of the discharge instructions.  - Walked pt to the discharge station.

## 2019-03-10 NOTE — ED TRIAGE NOTES
"Patient reports sore throat, cough, and congestion. Was recently exposed to strep throat and "wants to get checked out".  "

## 2019-03-13 LAB — BACTERIA THROAT CULT: NORMAL

## 2019-03-19 ENCOUNTER — TELEPHONE (OUTPATIENT)
Dept: PHARMACY | Facility: CLINIC | Age: 24
End: 2019-03-19

## 2019-03-25 NOTE — PROGRESS NOTES
CRS Office Visit History and Physical    Referring Md:   Shaista Christy Md  [address] [place] [address] [place] [address]    SUBJECTIVE:     Chief Complaint: anal condyloma    History of Present Illness:  The patient is new patient to this practice.   Course is as follows:  Patient is a 23 y.o. female presents with anal condyloma.  She noticed condyloma approximately 10 months ago.  She feels that has gotten worse over time.  Denies bleeding.  Does state that she has itching in trouble with colon anus.  Denies anal receptive intercourse.  No prior abnormal Pap smears.  She does smoke daily.  Her HIV status was checked during pregnancy was negative.  She has tried imiquimod for 2 weeks without any benefit.    Last Colonoscopy: none    Review of patient's allergies indicates:   Allergen Reactions    Pickles [cucumber fruit extract] Anaphylaxis and Swelling       Past Medical History:   Diagnosis Date    Anemia      No past surgical history on file.  Family History   Problem Relation Age of Onset    Ovarian cancer Maternal Grandmother     Bladder Cancer Mother     Colon cancer Neg Hx     Breast cancer Neg Hx      Social History     Tobacco Use    Smoking status: Current Every Day Smoker     Packs/day: 0.25     Years: 10.00     Pack years: 2.50     Types: Cigarettes    Smokeless tobacco: Never Used   Substance Use Topics    Alcohol use: Yes     Alcohol/week: 0.6 oz     Types: 1 Glasses of wine per week     Frequency: Never     Comment: sips     Drug use: No        Review of Systems:  Review of Systems   Constitutional: Negative for chills, diaphoresis, fever, malaise/fatigue and weight loss.   HENT: Negative for congestion.    Respiratory: Negative for shortness of breath.    Cardiovascular: Negative for chest pain and leg swelling.   Gastrointestinal: Negative for abdominal pain, blood in stool, constipation, nausea and vomiting.   Genitourinary: Negative for dysuria.   Musculoskeletal: Negative for back pain  "and myalgias.   Skin: Positive for itching and rash.   Neurological: Negative for dizziness and weakness.   Endo/Heme/Allergies: Does not bruise/bleed easily.   Psychiatric/Behavioral: Negative for depression.       OBJECTIVE:     Vital Signs (Most Recent)  /65 (BP Location: Left arm, Patient Position: Sitting, BP Method: Large (Automatic))   Pulse 74   Ht 5' 3" (1.6 m)   Wt 83.4 kg (183 lb 13.8 oz)   BMI 32.57 kg/m²     Physical Exam:  General: White female in no distress   Neuro: alert and oriented x 4.  Moves all extremities.     HEENT: no icterus.  Trachea midline  Respiratory: respirations are even and unlabored  Cardiac: regular rate  Abdomen:  Soft, nontender, no masses  Extremities: Warm dry and intact  Skin: no rashes  Anorectal:  External exam demonstrates perianal condyloma extending approximately 3 cm in the anal verge.  Digital exam was performed. No obvious internal abnormalities.  Anoscopy was then performed. No internal condyloma were seen.    Labs: H/H = 9.7/29.5    Imaging: none      ASSESSMENT/PLAN:     Wilmer was seen today for anal condylomata.    Diagnoses and all orders for this visit:    Anal condylomata  -     Case Request Operating Room: FULGURATION, CONDYLOMA, ANUS, prone        23-year-old woman with perianal condyloma.  This has failed imiquimod treatment.  Therefore excision fulguration recommended.  We discussed the risks of recurrence, transmission to others, bleeding, scarring.  Smoking cessation was encouraged.  Consents were signed today.  She will need to be in prone position.  We discussed that after excision of fulguration, she may benefit from further Aldara cream for any future recurrence.    AMITA Yeung MD  Staff Surgeon  Colon & Rectal Surgery    "

## 2019-03-25 NOTE — H&P (VIEW-ONLY)
CRS Office Visit History and Physical    Referring Md:   Shaista Christy Md  [address] [place] [address] [place] [address]    SUBJECTIVE:     Chief Complaint: anal condyloma    History of Present Illness:  The patient is new patient to this practice.   Course is as follows:  Patient is a 23 y.o. female presents with anal condyloma.  She noticed condyloma approximately 10 months ago.  She feels that has gotten worse over time.  Denies bleeding.  Does state that she has itching in trouble with colon anus.  Denies anal receptive intercourse.  No prior abnormal Pap smears.  She does smoke daily.  Her HIV status was checked during pregnancy was negative.  She has tried imiquimod for 2 weeks without any benefit.    Last Colonoscopy: none    Review of patient's allergies indicates:   Allergen Reactions    Pickles [cucumber fruit extract] Anaphylaxis and Swelling       Past Medical History:   Diagnosis Date    Anemia      No past surgical history on file.  Family History   Problem Relation Age of Onset    Ovarian cancer Maternal Grandmother     Bladder Cancer Mother     Colon cancer Neg Hx     Breast cancer Neg Hx      Social History     Tobacco Use    Smoking status: Current Every Day Smoker     Packs/day: 0.25     Years: 10.00     Pack years: 2.50     Types: Cigarettes    Smokeless tobacco: Never Used   Substance Use Topics    Alcohol use: Yes     Alcohol/week: 0.6 oz     Types: 1 Glasses of wine per week     Frequency: Never     Comment: sips     Drug use: No        Review of Systems:  Review of Systems   Constitutional: Negative for chills, diaphoresis, fever, malaise/fatigue and weight loss.   HENT: Negative for congestion.    Respiratory: Negative for shortness of breath.    Cardiovascular: Negative for chest pain and leg swelling.   Gastrointestinal: Negative for abdominal pain, blood in stool, constipation, nausea and vomiting.   Genitourinary: Negative for dysuria.   Musculoskeletal: Negative for back pain  "and myalgias.   Skin: Positive for itching and rash.   Neurological: Negative for dizziness and weakness.   Endo/Heme/Allergies: Does not bruise/bleed easily.   Psychiatric/Behavioral: Negative for depression.       OBJECTIVE:     Vital Signs (Most Recent)  /65 (BP Location: Left arm, Patient Position: Sitting, BP Method: Large (Automatic))   Pulse 74   Ht 5' 3" (1.6 m)   Wt 83.4 kg (183 lb 13.8 oz)   BMI 32.57 kg/m²     Physical Exam:  General: White female in no distress   Neuro: alert and oriented x 4.  Moves all extremities.     HEENT: no icterus.  Trachea midline  Respiratory: respirations are even and unlabored  Cardiac: regular rate  Abdomen:  Soft, nontender, no masses  Extremities: Warm dry and intact  Skin: no rashes  Anorectal:  External exam demonstrates perianal condyloma extending approximately 3 cm in the anal verge.  Digital exam was performed. No obvious internal abnormalities.  Anoscopy was then performed. No internal condyloma were seen.    Labs: H/H = 9.7/29.5    Imaging: none      ASSESSMENT/PLAN:     Wilmer was seen today for anal condylomata.    Diagnoses and all orders for this visit:    Anal condylomata  -     Case Request Operating Room: FULGURATION, CONDYLOMA, ANUS, prone        23-year-old woman with perianal condyloma.  This has failed imiquimod treatment.  Therefore excision fulguration recommended.  We discussed the risks of recurrence, transmission to others, bleeding, scarring.  Smoking cessation was encouraged.  Consents were signed today.  She will need to be in prone position.  We discussed that after excision of fulguration, she may benefit from further Aldara cream for any future recurrence.    AMITA Yeung MD  Staff Surgeon  Colon & Rectal Surgery    "

## 2019-03-26 ENCOUNTER — LAB VISIT (OUTPATIENT)
Dept: LAB | Facility: HOSPITAL | Age: 24
End: 2019-03-26
Attending: COLON & RECTAL SURGERY
Payer: MEDICAID

## 2019-03-26 ENCOUNTER — OFFICE VISIT (OUTPATIENT)
Dept: SURGERY | Facility: CLINIC | Age: 24
End: 2019-03-26
Payer: MEDICAID

## 2019-03-26 VITALS
WEIGHT: 183.88 LBS | HEIGHT: 63 IN | DIASTOLIC BLOOD PRESSURE: 65 MMHG | BODY MASS INDEX: 32.58 KG/M2 | HEART RATE: 74 BPM | SYSTOLIC BLOOD PRESSURE: 109 MMHG

## 2019-03-26 DIAGNOSIS — Z01.818 PRE-OP EVALUATION: ICD-10-CM

## 2019-03-26 DIAGNOSIS — Z01.818 PRE-OP EVALUATION: Primary | ICD-10-CM

## 2019-03-26 DIAGNOSIS — A63.0 ANAL CONDYLOMATA: Primary | ICD-10-CM

## 2019-03-26 LAB
ALBUMIN SERPL BCP-MCNC: 3.6 G/DL (ref 3.5–5.2)
ALP SERPL-CCNC: 59 U/L (ref 55–135)
ALT SERPL W/O P-5'-P-CCNC: 37 U/L (ref 10–44)
ANION GAP SERPL CALC-SCNC: 8 MMOL/L (ref 8–16)
AST SERPL-CCNC: 24 U/L (ref 10–40)
BASOPHILS # BLD AUTO: 0.06 K/UL (ref 0–0.2)
BASOPHILS NFR BLD: 0.6 % (ref 0–1.9)
BILIRUB SERPL-MCNC: 0.4 MG/DL (ref 0.1–1)
BUN SERPL-MCNC: 7 MG/DL (ref 6–20)
CALCIUM SERPL-MCNC: 9.3 MG/DL (ref 8.7–10.5)
CHLORIDE SERPL-SCNC: 109 MMOL/L (ref 95–110)
CO2 SERPL-SCNC: 24 MMOL/L (ref 23–29)
CREAT SERPL-MCNC: 0.7 MG/DL (ref 0.5–1.4)
DIFFERENTIAL METHOD: ABNORMAL
EOSINOPHIL # BLD AUTO: 0.2 K/UL (ref 0–0.5)
EOSINOPHIL NFR BLD: 1.8 % (ref 0–8)
ERYTHROCYTE [DISTWIDTH] IN BLOOD BY AUTOMATED COUNT: 14.6 % (ref 11.5–14.5)
EST. GFR  (AFRICAN AMERICAN): >60 ML/MIN/1.73 M^2
EST. GFR  (NON AFRICAN AMERICAN): >60 ML/MIN/1.73 M^2
GLUCOSE SERPL-MCNC: 86 MG/DL (ref 70–110)
HCT VFR BLD AUTO: 38.3 % (ref 37–48.5)
HGB BLD-MCNC: 11.9 G/DL (ref 12–16)
IMM GRANULOCYTES # BLD AUTO: 0.02 K/UL (ref 0–0.04)
IMM GRANULOCYTES NFR BLD AUTO: 0.2 % (ref 0–0.5)
LYMPHOCYTES # BLD AUTO: 2.8 K/UL (ref 1–4.8)
LYMPHOCYTES NFR BLD: 30 % (ref 18–48)
MCH RBC QN AUTO: 28.9 PG (ref 27–31)
MCHC RBC AUTO-ENTMCNC: 31.1 G/DL (ref 32–36)
MCV RBC AUTO: 93 FL (ref 82–98)
MONOCYTES # BLD AUTO: 0.7 K/UL (ref 0.3–1)
MONOCYTES NFR BLD: 7.4 % (ref 4–15)
NEUTROPHILS # BLD AUTO: 5.7 K/UL (ref 1.8–7.7)
NEUTROPHILS NFR BLD: 60 % (ref 38–73)
NRBC BLD-RTO: 0 /100 WBC
PLATELET # BLD AUTO: 387 K/UL (ref 150–350)
PMV BLD AUTO: 10.8 FL (ref 9.2–12.9)
POTASSIUM SERPL-SCNC: 4 MMOL/L (ref 3.5–5.1)
PROT SERPL-MCNC: 7.1 G/DL (ref 6–8.4)
RBC # BLD AUTO: 4.12 M/UL (ref 4–5.4)
SODIUM SERPL-SCNC: 141 MMOL/L (ref 136–145)
WBC # BLD AUTO: 9.44 K/UL (ref 3.9–12.7)

## 2019-03-26 PROCEDURE — 36415 COLL VENOUS BLD VENIPUNCTURE: CPT

## 2019-03-26 PROCEDURE — 99203 PR OFFICE/OUTPT VISIT, NEW, LEVL III, 30-44 MIN: ICD-10-PCS | Mod: S$PBB,25,, | Performed by: COLON & RECTAL SURGERY

## 2019-03-26 PROCEDURE — 85025 COMPLETE CBC W/AUTO DIFF WBC: CPT

## 2019-03-26 PROCEDURE — 99999 PR PBB SHADOW E&M-EST. PATIENT-LVL III: ICD-10-PCS | Mod: PBBFAC,,, | Performed by: COLON & RECTAL SURGERY

## 2019-03-26 PROCEDURE — 46600 PR DIAG2STIC A2SCOPY: ICD-10-PCS | Mod: S$PBB,,, | Performed by: COLON & RECTAL SURGERY

## 2019-03-26 PROCEDURE — 46600 DIAGNOSTIC ANOSCOPY SPX: CPT | Mod: S$PBB,,, | Performed by: COLON & RECTAL SURGERY

## 2019-03-26 PROCEDURE — 99999 PR PBB SHADOW E&M-EST. PATIENT-LVL III: CPT | Mod: PBBFAC,,, | Performed by: COLON & RECTAL SURGERY

## 2019-03-26 PROCEDURE — 80053 COMPREHEN METABOLIC PANEL: CPT

## 2019-03-26 PROCEDURE — 46600 DIAGNOSTIC ANOSCOPY SPX: CPT | Mod: PBBFAC | Performed by: COLON & RECTAL SURGERY

## 2019-03-26 PROCEDURE — 99203 OFFICE O/P NEW LOW 30 MIN: CPT | Mod: S$PBB,25,, | Performed by: COLON & RECTAL SURGERY

## 2019-03-26 PROCEDURE — 99213 OFFICE O/P EST LOW 20 MIN: CPT | Mod: PBBFAC,25 | Performed by: COLON & RECTAL SURGERY

## 2019-03-26 NOTE — LETTER
March 26, 2019      Shaista Christy MD  [address] [facility] [place] [address]           [facility]  [address]  [place] [address]  Phone: [phone]          Patient: Wilmer Honeycutt   MR Number: [id number]   YOB: 1995   Date of Visit: 3/26/2019       Dear Dr. Shaista Christy:    Thank you for referring Wilmer Honeycutt to me for evaluation. Attached you will find relevant portions of my assessment and plan of care.    If you have questions, please do not hesitate to call me. I look forward to following Wilmer Honeycutt along with you.    Sincerely,    Harsh Yeung MD    Enclosure  CC:  No Recipients    If you would like to receive this communication electronically, please contact [email or web address] or [phone] to request more information on [employer] access.    For providers and/or their staff who would like to refer a patient to [facility], please contact us through our one-stop-shop provider referral line, [facility], at [phone].    If you feel you have received this communication in error or would no longer like to receive these types of communications, please e-mail [email or web address]

## 2019-04-01 ENCOUNTER — ANESTHESIA (OUTPATIENT)
Dept: SURGERY | Facility: HOSPITAL | Age: 24
End: 2019-04-01
Payer: MEDICAID

## 2019-04-01 ENCOUNTER — HOSPITAL ENCOUNTER (OUTPATIENT)
Facility: HOSPITAL | Age: 24
Discharge: HOME | End: 2019-04-01
Attending: COLON & RECTAL SURGERY | Admitting: COLON & RECTAL SURGERY
Payer: MEDICAID

## 2019-04-01 ENCOUNTER — ANESTHESIA EVENT (OUTPATIENT)
Dept: SURGERY | Facility: HOSPITAL | Age: 24
End: 2019-04-01
Payer: MEDICAID

## 2019-04-01 VITALS
HEART RATE: 70 BPM | DIASTOLIC BLOOD PRESSURE: 56 MMHG | BODY MASS INDEX: 32.43 KG/M2 | WEIGHT: 183 LBS | RESPIRATION RATE: 16 BRPM | SYSTOLIC BLOOD PRESSURE: 115 MMHG | OXYGEN SATURATION: 98 % | HEIGHT: 63 IN | TEMPERATURE: 98 F

## 2019-04-01 DIAGNOSIS — A63.0 ANAL CONDYLOMA: Primary | ICD-10-CM

## 2019-04-01 LAB
B-HCG UR QL: NEGATIVE
CTP QC/QA: YES

## 2019-04-01 PROCEDURE — D9220A PRA ANESTHESIA: Mod: CRNA,,, | Performed by: NURSE ANESTHETIST, CERTIFIED REGISTERED

## 2019-04-01 PROCEDURE — 25000003 PHARM REV CODE 250: Performed by: NURSE PRACTITIONER

## 2019-04-01 PROCEDURE — 49610 REPAIR UMBILICAL LESION: CPT | Mod: 51,,, | Performed by: COLON & RECTAL SURGERY

## 2019-04-01 PROCEDURE — 25000003 PHARM REV CODE 250: Performed by: NURSE ANESTHETIST, CERTIFIED REGISTERED

## 2019-04-01 PROCEDURE — 25000003 PHARM REV CODE 250: Performed by: COLON & RECTAL SURGERY

## 2019-04-01 PROCEDURE — 00902 ANES ANORECTAL PX: CPT | Performed by: COLON & RECTAL SURGERY

## 2019-04-01 PROCEDURE — 46922 PR SURG EXCISION OF ANAL LESION(S): ICD-10-PCS | Mod: ,,, | Performed by: COLON & RECTAL SURGERY

## 2019-04-01 PROCEDURE — 25000003 PHARM REV CODE 250

## 2019-04-01 PROCEDURE — 63600175 PHARM REV CODE 636 W HCPCS: Performed by: NURSE ANESTHETIST, CERTIFIED REGISTERED

## 2019-04-01 PROCEDURE — 27000221 HC OXYGEN, UP TO 24 HOURS

## 2019-04-01 PROCEDURE — 63600175 PHARM REV CODE 636 W HCPCS: Performed by: ANESTHESIOLOGY

## 2019-04-01 PROCEDURE — D9220A PRA ANESTHESIA: ICD-10-PCS | Mod: CRNA,,, | Performed by: NURSE ANESTHETIST, CERTIFIED REGISTERED

## 2019-04-01 PROCEDURE — D9220A PRA ANESTHESIA: ICD-10-PCS | Mod: ANES,,, | Performed by: ANESTHESIOLOGY

## 2019-04-01 PROCEDURE — 63600175 PHARM REV CODE 636 W HCPCS: Performed by: COLON & RECTAL SURGERY

## 2019-04-01 PROCEDURE — 71000033 HC RECOVERY, INTIAL HOUR: Performed by: COLON & RECTAL SURGERY

## 2019-04-01 PROCEDURE — 37000008 HC ANESTHESIA 1ST 15 MINUTES: Performed by: COLON & RECTAL SURGERY

## 2019-04-01 PROCEDURE — 71000015 HC POSTOP RECOV 1ST HR: Performed by: COLON & RECTAL SURGERY

## 2019-04-01 PROCEDURE — 88305 TISSUE EXAM BY PATHOLOGIST: CPT | Mod: 26,,, | Performed by: PATHOLOGY

## 2019-04-01 PROCEDURE — 81025 URINE PREGNANCY TEST: CPT | Performed by: COLON & RECTAL SURGERY

## 2019-04-01 PROCEDURE — 37000009 HC ANESTHESIA EA ADD 15 MINS: Performed by: COLON & RECTAL SURGERY

## 2019-04-01 PROCEDURE — 49610: ICD-10-PCS | Mod: 51,,, | Performed by: COLON & RECTAL SURGERY

## 2019-04-01 PROCEDURE — 36000706: Performed by: COLON & RECTAL SURGERY

## 2019-04-01 PROCEDURE — 88305 TISSUE EXAM BY PATHOLOGIST: CPT | Performed by: PATHOLOGY

## 2019-04-01 PROCEDURE — 88305 TISSUE SPECIMEN TO PATHOLOGY - SURGERY: ICD-10-PCS | Mod: 26,,, | Performed by: PATHOLOGY

## 2019-04-01 PROCEDURE — D9220A PRA ANESTHESIA: Mod: ANES,,, | Performed by: ANESTHESIOLOGY

## 2019-04-01 PROCEDURE — 71000044 HC DOSC ROUTINE RECOVERY FIRST HOUR: Performed by: COLON & RECTAL SURGERY

## 2019-04-01 PROCEDURE — 36000707: Performed by: COLON & RECTAL SURGERY

## 2019-04-01 PROCEDURE — 46922 EXCISION OF ANAL LESION(S): CPT | Mod: ,,, | Performed by: COLON & RECTAL SURGERY

## 2019-04-01 PROCEDURE — 71000039 HC RECOVERY, EACH ADD'L HOUR: Performed by: COLON & RECTAL SURGERY

## 2019-04-01 PROCEDURE — C9290 INJ, BUPIVACAINE LIPOSOME: HCPCS | Performed by: COLON & RECTAL SURGERY

## 2019-04-01 RX ORDER — ONDANSETRON 2 MG/ML
INJECTION INTRAMUSCULAR; INTRAVENOUS
Status: DISCONTINUED | OUTPATIENT
Start: 2019-04-01 | End: 2019-04-01

## 2019-04-01 RX ORDER — ROCURONIUM BROMIDE 10 MG/ML
INJECTION, SOLUTION INTRAVENOUS
Status: DISCONTINUED | OUTPATIENT
Start: 2019-04-01 | End: 2019-04-01

## 2019-04-01 RX ORDER — FENTANYL CITRATE 50 UG/ML
INJECTION, SOLUTION INTRAMUSCULAR; INTRAVENOUS
Status: DISCONTINUED | OUTPATIENT
Start: 2019-04-01 | End: 2019-04-01

## 2019-04-01 RX ORDER — BUPIVACAINE HYDROCHLORIDE 2.5 MG/ML
INJECTION, SOLUTION EPIDURAL; INFILTRATION; INTRACAUDAL
Status: DISCONTINUED | OUTPATIENT
Start: 2019-04-01 | End: 2019-04-01 | Stop reason: HOSPADM

## 2019-04-01 RX ORDER — MUPIROCIN 20 MG/G
OINTMENT TOPICAL
Status: DISPENSED | OUTPATIENT
Start: 2019-04-01

## 2019-04-01 RX ORDER — KETOROLAC TROMETHAMINE 30 MG/ML
INJECTION, SOLUTION INTRAMUSCULAR; INTRAVENOUS
Status: DISCONTINUED | OUTPATIENT
Start: 2019-04-01 | End: 2019-04-01

## 2019-04-01 RX ORDER — LIDOCAINE HYDROCHLORIDE 10 MG/ML
1 INJECTION, SOLUTION EPIDURAL; INFILTRATION; INTRACAUDAL; PERINEURAL ONCE
Status: COMPLETED | OUTPATIENT
Start: 2019-04-01 | End: 2019-04-01

## 2019-04-01 RX ORDER — SODIUM CHLORIDE 9 MG/ML
INJECTION, SOLUTION INTRAVENOUS CONTINUOUS
Status: DISCONTINUED | OUTPATIENT
Start: 2019-04-01 | End: 2019-04-03

## 2019-04-01 RX ORDER — DEXAMETHASONE SODIUM PHOSPHATE 4 MG/ML
INJECTION, SOLUTION INTRA-ARTICULAR; INTRALESIONAL; INTRAMUSCULAR; INTRAVENOUS; SOFT TISSUE
Status: DISCONTINUED | OUTPATIENT
Start: 2019-04-01 | End: 2019-04-01

## 2019-04-01 RX ORDER — OXYCODONE AND ACETAMINOPHEN 5; 325 MG/1; MG/1
1 TABLET ORAL ONCE AS NEEDED
Status: COMPLETED | OUTPATIENT
Start: 2019-04-01 | End: 2019-04-01

## 2019-04-01 RX ORDER — POLYETHYLENE GLYCOL 3350 17 G/17G
17 POWDER, FOR SOLUTION ORAL DAILY PRN
Qty: 119 G | Refills: 0 | Status: SHIPPED | OUTPATIENT
Start: 2019-04-01 | End: 2019-04-08

## 2019-04-01 RX ORDER — PROPOFOL 10 MG/ML
VIAL (ML) INTRAVENOUS
Status: DISCONTINUED | OUTPATIENT
Start: 2019-04-01 | End: 2019-04-01

## 2019-04-01 RX ORDER — HYDROMORPHONE HYDROCHLORIDE 1 MG/ML
0.2 INJECTION, SOLUTION INTRAMUSCULAR; INTRAVENOUS; SUBCUTANEOUS EVERY 5 MIN PRN
Status: DISCONTINUED | OUTPATIENT
Start: 2019-04-01 | End: 2019-04-01 | Stop reason: HOSPADM

## 2019-04-01 RX ORDER — SODIUM CHLORIDE 9 MG/ML
INJECTION, SOLUTION INTRAVENOUS CONTINUOUS
Status: DISCONTINUED | OUTPATIENT
Start: 2019-04-01 | End: 2019-04-01 | Stop reason: HOSPADM

## 2019-04-01 RX ORDER — MEPERIDINE HYDROCHLORIDE 50 MG/ML
12.5 INJECTION INTRAMUSCULAR; INTRAVENOUS; SUBCUTANEOUS ONCE AS NEEDED
Status: DISCONTINUED | OUTPATIENT
Start: 2019-04-01 | End: 2019-04-01 | Stop reason: HOSPADM

## 2019-04-01 RX ORDER — KETAMINE HYDROCHLORIDE 10 MG/ML
INJECTION, SOLUTION INTRAMUSCULAR; INTRAVENOUS
Status: DISCONTINUED | OUTPATIENT
Start: 2019-04-01 | End: 2019-04-01

## 2019-04-01 RX ORDER — MIDAZOLAM HYDROCHLORIDE 1 MG/ML
INJECTION, SOLUTION INTRAMUSCULAR; INTRAVENOUS
Status: DISCONTINUED | OUTPATIENT
Start: 2019-04-01 | End: 2019-04-01

## 2019-04-01 RX ORDER — LIDOCAINE HCL/PF 100 MG/5ML
SYRINGE (ML) INTRAVENOUS
Status: DISCONTINUED | OUTPATIENT
Start: 2019-04-01 | End: 2019-04-01

## 2019-04-01 RX ORDER — SUCCINYLCHOLINE CHLORIDE 20 MG/ML
INJECTION INTRAMUSCULAR; INTRAVENOUS
Status: DISCONTINUED | OUTPATIENT
Start: 2019-04-01 | End: 2019-04-01

## 2019-04-01 RX ORDER — OXYCODONE AND ACETAMINOPHEN 5; 325 MG/1; MG/1
1 TABLET ORAL EVERY 4 HOURS PRN
Qty: 30 TABLET | Refills: 0 | Status: SHIPPED | OUTPATIENT
Start: 2019-04-01 | End: 2019-06-21

## 2019-04-01 RX ORDER — OXYCODONE AND ACETAMINOPHEN 5; 325 MG/1; MG/1
TABLET ORAL
Status: COMPLETED
Start: 2019-04-01 | End: 2019-04-01

## 2019-04-01 RX ORDER — LORAZEPAM 2 MG/ML
0.25 INJECTION INTRAMUSCULAR ONCE AS NEEDED
Status: COMPLETED | OUTPATIENT
Start: 2019-04-01 | End: 2019-04-01

## 2019-04-01 RX ADMIN — LIDOCAINE HYDROCHLORIDE 0.2 MG: 10 INJECTION, SOLUTION EPIDURAL; INFILTRATION; INTRACAUDAL; PERINEURAL at 08:04

## 2019-04-01 RX ADMIN — KETAMINE HYDROCHLORIDE 30 MG: 10 INJECTION, SOLUTION INTRAMUSCULAR; INTRAVENOUS at 08:04

## 2019-04-01 RX ADMIN — LIDOCAINE HYDROCHLORIDE 75 MG: 20 INJECTION, SOLUTION INTRAVENOUS at 08:04

## 2019-04-01 RX ADMIN — SUCCINYLCHOLINE CHLORIDE 140 MG: 20 INJECTION, SOLUTION INTRAMUSCULAR; INTRAVENOUS at 08:04

## 2019-04-01 RX ADMIN — OXYCODONE HYDROCHLORIDE AND ACETAMINOPHEN 1 TABLET: 5; 325 TABLET ORAL at 09:04

## 2019-04-01 RX ADMIN — ONDANSETRON 4 MG: 2 INJECTION INTRAMUSCULAR; INTRAVENOUS at 08:04

## 2019-04-01 RX ADMIN — KETOROLAC TROMETHAMINE 30 MG: 30 INJECTION, SOLUTION INTRAMUSCULAR; INTRAVENOUS at 08:04

## 2019-04-01 RX ADMIN — LORAZEPAM 0.25 MG: 2 INJECTION INTRAMUSCULAR; INTRAVENOUS at 10:04

## 2019-04-01 RX ADMIN — SODIUM CHLORIDE: 0.9 INJECTION, SOLUTION INTRAVENOUS at 08:04

## 2019-04-01 RX ADMIN — DEXAMETHASONE SODIUM PHOSPHATE 4 MG: 4 INJECTION, SOLUTION INTRAMUSCULAR; INTRAVENOUS at 08:04

## 2019-04-01 RX ADMIN — MUPIROCIN: 20 OINTMENT TOPICAL at 08:04

## 2019-04-01 RX ADMIN — OXYCODONE AND ACETAMINOPHEN 1 TABLET: 5; 325 TABLET ORAL at 09:04

## 2019-04-01 RX ADMIN — LORAZEPAM 0.25 MG: 2 INJECTION, SOLUTION INTRAMUSCULAR; INTRAVENOUS at 09:04

## 2019-04-01 RX ADMIN — PROPOFOL 200 MG: 10 INJECTION, EMULSION INTRAVENOUS at 08:04

## 2019-04-01 RX ADMIN — MIDAZOLAM HYDROCHLORIDE 2 MG: 1 INJECTION, SOLUTION INTRAMUSCULAR; INTRAVENOUS at 08:04

## 2019-04-01 RX ADMIN — ROCURONIUM BROMIDE 5 MG: 10 INJECTION, SOLUTION INTRAVENOUS at 08:04

## 2019-04-01 RX ADMIN — FENTANYL CITRATE 100 MCG: 50 INJECTION, SOLUTION INTRAMUSCULAR; INTRAVENOUS at 08:04

## 2019-04-01 NOTE — PROGRESS NOTES
"Spoke with Dr. Hughes per telephone to report patient c/o "anxiety attack."Patient reports "had a bad nightmare about her child and already suffers with anxiety." Telephone order for repeat dose of Ativan received.  "

## 2019-04-01 NOTE — OP NOTE
WILMER CARTY  [id number]  [id number]    OPERATIVE NOTE:    DATE OF OPERATION: 04/01/2019    PREOPERATIVE DIAGNOSIS:  Perianal condyloma    POSTOPERATIVE DIAGNOSIS:  Perianal condyloma    PROCEDURE PERFORMED:  Excision and fulguration of perianal condyloma    ATTENDING SURGEON: AMITA Yeung MD    ASSISTING SURGEON: Rex    ANESTHESIA:  General    ESTIMATED BLOOD LOSS:  5 mL    FINDINGS:  1.  Perianal condyloma.  No intra-anal condyloma    SPECIMENS:  Perianal condyloma    COMPLICATIONS:  None    DISPOSITION: PACU    CONDITION: good    INDICATION FOR PROCEDURE:  Wilmer Carty is a 23 y.o. female presented with perianal condyloma.  She had tried Aldara cream without any benefit.  Therefore excision fulguration was recommended.    DESCRIPTION OF PROCEDURE:   After informed consent was reviewed, the patient was taken to the operating room and placed under general anesthesia.  she was placed in the prone gavin-knife position.  The buttocks were taped apart and the perianal skin was prepped draped in usual sterile fashion.  A time-out was then performed.  External exam demonstrated perianal condyloma extending from the anal verge out words in a radial fashion by approximately 3 cm.  All the condyloma was soft and benign appearing.  Large condyloma were removed sharply with scissors and sent to pathology for further review.  The remainder of the small volume condyloma was treated topically with electrocautery.  Digital exam was performed.  No masses were palpated internally.  Detailed anoscopy was then performed using the Hill-Salome anoscope.  There were a few punctate lesions at the dentate line that were treated topically with electrocautery. No large lesions or concerning abnormalities were seen on the anoscopy.  50 cc of 0.25% Marcaine mixed with Exparel were injected as a long-acting local anesthetic.  Fluff dressings were applied.  She was placed in the supine position, extubated, taken to PACU  in stable condition.      AMITA Yeung MD  Staff Surgeon  Colon & Rectal Surgery

## 2019-04-01 NOTE — TRANSFER OF CARE
"Anesthesia Transfer of Care Note    Patient: Wilmer Honeycutt    Procedure(s) Performed: Procedure(s) (LRB):  FULGURATION, CONDYLOMA, ANUS, prone (N/A)    Patient location: PACU    Anesthesia Type: general    Transport from OR: Transported from OR on 6-10 L/min O2 by face mask with adequate spontaneous ventilation    Post pain: adequate analgesia    Post assessment: no apparent anesthetic complications and tolerated procedure well    Post vital signs: stable    Level of consciousness: sedated    Nausea/Vomiting: no nausea/vomiting    Complications: none    Transfer of care protocol was followed      Last vitals:   Visit Vitals  BP (!) 111/59 (BP Location: Right arm, Patient Position: Lying)   Pulse 70   Temp 36.6 °C (97.8 °F) (Oral)   Resp 12   Ht 5' 3" (1.6 m)   Wt 83 kg (183 lb)   LMP 03/11/2019 (Within Days)   SpO2 100%   Breastfeeding? No   BMI 32.42 kg/m²     "

## 2019-04-01 NOTE — DISCHARGE INSTRUCTIONS
Anal Surgery Post Op Instructions:    You had a fulguration of anal condyloma performed today.     Wound care:  Expect some drainage over the next few weeks as the wound heals.  Please wear a pad to help with drainage.     You have no activity restrictions.   No dietary restrictions.    Medications:  A narcotic pain medication has been prescribed.  Please start to wean the pain medication over the following few days.  You can take tylenol instead of the pain medication.      Please take miralax 1 capful at night to prevent constipation associated with narcotic pain medications.      Follow up:  Return to clinic in 3 weeks for follow up and wound check.    AMITA Yeung MD  Staff Surgeon  Colon & Rectal Surgery

## 2019-04-01 NOTE — ANESTHESIA POSTPROCEDURE EVALUATION
Anesthesia Post Evaluation    Patient: Wilmer Honeycutt    Procedure(s) Performed: Procedure(s) (LRB):  FULGURATION, CONDYLOMA, ANUS, prone (N/A)    Final Anesthesia Type: general  Patient location during evaluation: PACU  Patient participation: Yes- Able to Participate  Level of consciousness: awake and alert  Post-procedure vital signs: reviewed and stable  Pain management: adequate  Airway patency: patent  PONV status at discharge: No PONV  Anesthetic complications: no      Cardiovascular status: blood pressure returned to baseline  Respiratory status: spontaneous ventilation and room air  Hydration status: euvolemic  Follow-up not needed.          Vitals Value Taken Time   /56 4/1/2019 11:05 AM   Temp 36.7 °C (98.1 °F) 4/1/2019 11:05 AM   Pulse 70 4/1/2019 11:05 AM   Resp 16 4/1/2019 11:05 AM   SpO2 98 % 4/1/2019 11:05 AM         Event Time     Out of Recovery 09:54:13          Pain/Hannah Score: Pain Rating Prior to Med Admin: 8 (4/1/2019  9:20 AM)  Hannah Score: 10 (4/1/2019  9:54 AM)

## 2019-04-01 NOTE — PLAN OF CARE
Pt AAOx4. Complaints of mild incisional pain but mostly anxiety and panic attacks. PRN PO pain medication administered as ordered. IV Lorazepam administered for anxiety. Patient voided and cleaned; new pair of mesh panties placed. Gauze to rectum remains CDI. VSS. Safety maintained. Pt to be discharged home per DOSC.

## 2019-04-01 NOTE — DISCHARGE SUMMARY
Operative Note     SUMMARY     Surgery Date: 4/1/2019     Surgeon(s) and Role:     * Harsh Yeung MD - Primary     * Salvatore Goodman MD - Resident - Assisting    Pre-op Diagnosis:  Anal condylomata [A63.0]    Post-op Diagnosis:  Post-Op Diagnosis Codes:     * Anal condylomata [A63.0]    Procedure(s) (LRB):  FULGURATION, CONDYLOMA, ANUS, prone (N/A)    Anesthesia: General    Description of Procedure:   EUA, fulguration anal condyloma     Findings/Key Components:  B/l anal condyloma     Estimated Blood Loss:  10cc         Specimens (From admission, onward)    Start     Ordered    04/01/19 0839  Specimen to Pathology - Surgery  Once     Start Status     04/01/19 0839 Collected (04/01/19 0840) Order ID: [id number]       04/01/19 0839            Discharge Note      SUMMARY     Admit Date: 4/1/2019    Attending Physician: Harsh Yeung MD     Discharge Physician: Harsh Yeung MD    Discharge Date: 4/1/2019     Final Diagnosis: Post-Op Diagnosis Codes:     * Anal condylomata [A63.0]    Hospital Course: Patient was admitted for an outpatient procedure and tolerated the procedure well with no complications.    Disposition: Home or Self Care    Follow Up/Patient Instructions: 3wks with Dr. Yeung  Current Discharge Medication List      START taking these medications    Details   oxyCODONE-acetaminophen (PERCOCET) 5-325 mg per tablet Take 1 tablet by mouth every 4 (four) hours as needed for Pain.  Qty: 30 tablet, Refills: 0      polyethylene glycol (GLYCOLAX) 17 gram/dose powder Take 17 g by mouth daily as needed.  Qty: 119 g, Refills: 0         CONTINUE these medications which have NOT CHANGED    Details   FLUoxetine 40 MG capsule Take 1 capsule (40 mg total) by mouth once daily.  Qty: 30 capsule, Refills: 2    Associated Diagnoses: Postpartum depression, postpartum condition      ferrous sulfate (FEOSOL) 325 mg (65 mg iron) Tab tablet Take 1 tablet (325 mg total) by mouth once daily.  Qty: 30  tablet, Refills: 3      hydrocortisone-pramoxine (PROCTOFOAM HC) rectal foam Place 1 applicator rectally 2 (two) times daily.  Qty: 10 g, Refills: 0    Associated Diagnoses: Hemorrhoids during pregnancy in third trimester      ibuprofen (ADVIL,MOTRIN) 600 MG tablet Take 1 tablet (600 mg total) by mouth every 6 (six) hours as needed (cramping).  Qty: 30 tablet, Refills: 0      imiquimod (ALDARA) 5 % cream Apply topically 4 (four) times a week. Apply for 4 consecutive days, then wait 3 days and repeat as directed  Qty: 16 packet, Refills: 6      levonorgestrel (MIRENA) 20 mcg/24 hr (5 years) IUD 1 Intra Uterine Device by Intrauterine route once. for 1 dose  Qty: 1 each, Refills: 0    Associated Diagnoses: Encounter for initial prescription of intrauterine contraceptive device (IUD)      medroxyPROGESTERone (DEPO-PROVERA) 150 mg/mL Syrg Inject 150 mg (1ml into the muscle) every 3 months.  Qty: 1 mL, Refills: 0      methylPREDNISolone (MEDROL DOSEPACK) 4 mg tablet Pack as directed  Qty: 1 Package, Refills: 0      ondansetron (ZOFRAN) 4 MG tablet Take 8 mg by mouth 2 (two) times daily.      [id number]-dha-epa-fish oil (PRENATAL GUMMY) 400 mcg-35 mg -25 mg-5 mg Chew Take by mouth.      pramoxine 1 % Foam Place rectally 3 (three) times daily as needed.  Qty: 15 g, Refills: 2    Associated Diagnoses: Postpartum hemorrhoids      PROCTOZONE-HC 2.5 % rectal cream       promethazine (PHENERGAN) 25 MG tablet Take 25 mg by mouth every 4 (four) hours.             Discharge Procedure Orders (must include Diet, Follow-up, Activity)   Discharge Procedure Orders (must include Diet, Follow-up, Activity)   Diet Adult Regular     Notify your health care provider if you experience any of the following:  temperature >100.4     Notify your health care provider if you experience any of the following:  persistent nausea and vomiting or diarrhea     Notify your health care provider if you experience any of the following:  severe uncontrolled  pain     Activity as tolerated

## 2019-04-01 NOTE — ANESTHESIA PREPROCEDURE EVALUATION
04/01/2019  Wilmer Honeycutt is a 23 y.o., female.    Anesthesia Evaluation    I have reviewed the Patient Summary Reports.    I have reviewed the Nursing Notes.      Review of Systems  Anesthesia Hx:  No problems with previous Anesthesia    Hematology/Oncology:     Oncology Normal    -- Anemia:   EENT/Dental:EENT/Dental Normal   Cardiovascular:  Cardiovascular Normal     Pulmonary:  Pulmonary Normal    Renal/:  Renal/ Normal     Hepatic/GI:  Hepatic/GI Normal    Musculoskeletal:  Musculoskeletal Normal    Neurological:  Neurology Normal    Endocrine:  Endocrine Normal    Dermatological:  Skin Normal    Psych:   Psychiatric History          Physical Exam  General:  Obesity    Airway/Jaw/Neck:  Airway Findings: Mouth Opening: Normal Tongue: Normal  General Airway Assessment: Adult  Mallampati: I  TM Distance: Normal, at least 6 cm        Eyes/Ears/Nose:  EYES/EARS/NOSE FINDINGS: Normal   Dental:  Dental Findings: In tact   Chest/Lungs:  Chest/Lungs Clear    Heart/Vascular:  Heart Findings: Normal Heart murmur: negative Vascular Findings: Normal    Abdomen:  Abdomen Findings: Normal    Musculoskeletal:  Musculoskeletal Findings: Normal   Skin:  Skin Findings: Normal    Mental Status:  Mental Status Findings: Normal        Anesthesia Plan  Type of Anesthesia, risks & benefits discussed:  Anesthesia Type:  general  Patient's Preference:   Intra-op Monitoring Plan:   Intra-op Monitoring Plan Comments:   Post Op Pain Control Plan:   Post Op Pain Control Plan Comments:   Induction:   IV  Beta Blocker:  Patient is not currently on a Beta-Blocker (No further documentation required).       Informed Consent: Patient understands risks and agrees with Anesthesia plan.  Questions answered. Anesthesia consent signed with patient.  ASA Score: 2     Day of Surgery Review of History & Physical:    H&P update referred to  the surgeon.         Ready For Surgery From Anesthesia Perspective.

## 2019-04-02 ENCOUNTER — TELEPHONE (OUTPATIENT)
Dept: OBSTETRICS AND GYNECOLOGY | Facility: CLINIC | Age: 24
End: 2019-04-02

## 2019-04-02 NOTE — TELEPHONE ENCOUNTER
----- Message from Xochitl Hardin sent at 4/2/2019  9:53 AM CDT -----  Contact: self  Wilmer Honeycutt  MRN: [id number]  Home Phone      [phone]  Work Phone      Not on file.  Mobile          [phone]    Patient Care Team:  Primary Doctor No as PCP - General  Tita Castillo MD (Obstetrics and Gynecology)  OB? No  What phone number can you be reached at? [phone]  Message:   Pt would like to check status of IUD. Please advise.

## 2019-04-03 ENCOUNTER — HOSPITAL ENCOUNTER (EMERGENCY)
Facility: HOSPITAL | Age: 24
Discharge: HOME | End: 2019-04-03
Attending: SURGERY
Payer: MEDICAID

## 2019-04-03 VITALS
BODY MASS INDEX: 32.69 KG/M2 | TEMPERATURE: 97 F | SYSTOLIC BLOOD PRESSURE: 110 MMHG | DIASTOLIC BLOOD PRESSURE: 54 MMHG | OXYGEN SATURATION: 98 % | HEART RATE: 96 BPM | RESPIRATION RATE: 18 BRPM | WEIGHT: 184.5 LBS

## 2019-04-03 DIAGNOSIS — N30.00 ACUTE CYSTITIS WITHOUT HEMATURIA: Primary | ICD-10-CM

## 2019-04-03 LAB
ALBUMIN SERPL BCP-MCNC: 3.3 G/DL (ref 3.5–5.2)
ALP SERPL-CCNC: 63 U/L (ref 55–135)
ALT SERPL W/O P-5'-P-CCNC: 55 U/L (ref 10–44)
ANION GAP SERPL CALC-SCNC: 11 MMOL/L (ref 8–16)
AST SERPL-CCNC: 43 U/L (ref 10–40)
B-HCG UR QL: NEGATIVE
BACTERIA #/AREA URNS HPF: ABNORMAL /HPF
BASOPHILS # BLD AUTO: 0.02 K/UL (ref 0–0.2)
BASOPHILS NFR BLD: 0.1 % (ref 0–1.9)
BILIRUB SERPL-MCNC: 0.6 MG/DL (ref 0.1–1)
BILIRUB UR QL STRIP: ABNORMAL
BUN SERPL-MCNC: 8 MG/DL (ref 6–20)
CALCIUM SERPL-MCNC: 8.7 MG/DL (ref 8.7–10.5)
CHLORIDE SERPL-SCNC: 104 MMOL/L (ref 95–110)
CLARITY UR: ABNORMAL
CO2 SERPL-SCNC: 21 MMOL/L (ref 23–29)
COLOR UR: YELLOW
CREAT SERPL-MCNC: 0.8 MG/DL (ref 0.5–1.4)
DIFFERENTIAL METHOD: ABNORMAL
EOSINOPHIL # BLD AUTO: 0.3 K/UL (ref 0–0.5)
EOSINOPHIL NFR BLD: 1.7 % (ref 0–8)
ERYTHROCYTE [DISTWIDTH] IN BLOOD BY AUTOMATED COUNT: 14.8 % (ref 11.5–14.5)
EST. GFR  (AFRICAN AMERICAN): >60 ML/MIN/1.73 M^2
EST. GFR  (NON AFRICAN AMERICAN): >60 ML/MIN/1.73 M^2
GLUCOSE SERPL-MCNC: 107 MG/DL (ref 70–110)
GLUCOSE UR QL STRIP: NEGATIVE
HCT VFR BLD AUTO: 34.9 % (ref 37–48.5)
HGB BLD-MCNC: 11.3 G/DL (ref 12–16)
HGB UR QL STRIP: ABNORMAL
HYALINE CASTS #/AREA URNS LPF: 0 /LPF
INFLUENZA A, MOLECULAR: NEGATIVE
INFLUENZA B, MOLECULAR: NEGATIVE
KETONES UR QL STRIP: ABNORMAL
LEUKOCYTE ESTERASE UR QL STRIP: ABNORMAL
LIPASE SERPL-CCNC: 4 U/L (ref 4–60)
LYMPHOCYTES # BLD AUTO: 1 K/UL (ref 1–4.8)
LYMPHOCYTES NFR BLD: 6.4 % (ref 18–48)
MCH RBC QN AUTO: 28.3 PG (ref 27–31)
MCHC RBC AUTO-ENTMCNC: 32.4 G/DL (ref 32–36)
MCV RBC AUTO: 88 FL (ref 82–98)
MICROSCOPIC COMMENT: ABNORMAL
MONOCYTES # BLD AUTO: 0.8 K/UL (ref 0.3–1)
MONOCYTES NFR BLD: 5 % (ref 4–15)
NEUTROPHILS # BLD AUTO: 13.1 K/UL (ref 1.8–7.7)
NEUTROPHILS NFR BLD: 86.8 % (ref 38–73)
NITRITE UR QL STRIP: NEGATIVE
PH UR STRIP: 6 [PH] (ref 5–8)
PLATELET # BLD AUTO: 319 K/UL (ref 150–350)
PMV BLD AUTO: 10.8 FL (ref 9.2–12.9)
POTASSIUM SERPL-SCNC: 3.5 MMOL/L (ref 3.5–5.1)
PROT SERPL-MCNC: 6.7 G/DL (ref 6–8.4)
PROT UR QL STRIP: ABNORMAL
RBC # BLD AUTO: 3.99 M/UL (ref 4–5.4)
RBC #/AREA URNS HPF: 5 /HPF (ref 0–4)
SODIUM SERPL-SCNC: 136 MMOL/L (ref 136–145)
SP GR UR STRIP: >=1.03 (ref 1–1.03)
SPECIMEN SOURCE: NORMAL
URN SPEC COLLECT METH UR: ABNORMAL
UROBILINOGEN UR STRIP-ACNC: 1 EU/DL
WBC # BLD AUTO: 15.1 K/UL (ref 3.9–12.7)
WBC #/AREA URNS HPF: >100 /HPF (ref 0–5)

## 2019-04-03 PROCEDURE — 96375 TX/PRO/DX INJ NEW DRUG ADDON: CPT

## 2019-04-03 PROCEDURE — 87502 INFLUENZA DNA AMP PROBE: CPT

## 2019-04-03 PROCEDURE — 80053 COMPREHEN METABOLIC PANEL: CPT

## 2019-04-03 PROCEDURE — 81000 URINALYSIS NONAUTO W/SCOPE: CPT

## 2019-04-03 PROCEDURE — 96361 HYDRATE IV INFUSION ADD-ON: CPT

## 2019-04-03 PROCEDURE — 25500020 PHARM REV CODE 255: Performed by: SURGERY

## 2019-04-03 PROCEDURE — 85025 COMPLETE CBC W/AUTO DIFF WBC: CPT

## 2019-04-03 PROCEDURE — 96365 THER/PROPH/DIAG IV INF INIT: CPT

## 2019-04-03 PROCEDURE — 36415 COLL VENOUS BLD VENIPUNCTURE: CPT

## 2019-04-03 PROCEDURE — 25000003 PHARM REV CODE 250: Performed by: NURSE PRACTITIONER

## 2019-04-03 PROCEDURE — 81025 URINE PREGNANCY TEST: CPT

## 2019-04-03 PROCEDURE — 83690 ASSAY OF LIPASE: CPT

## 2019-04-03 PROCEDURE — 87086 URINE CULTURE/COLONY COUNT: CPT

## 2019-04-03 PROCEDURE — 63600175 PHARM REV CODE 636 W HCPCS: Performed by: NURSE PRACTITIONER

## 2019-04-03 PROCEDURE — 99284 EMERGENCY DEPT VISIT MOD MDM: CPT | Mod: 25

## 2019-04-03 RX ORDER — SODIUM CHLORIDE 9 MG/ML
1000 INJECTION, SOLUTION INTRAVENOUS
Status: COMPLETED | OUTPATIENT
Start: 2019-04-03 | End: 2019-04-03

## 2019-04-03 RX ORDER — NITROFURANTOIN 25; 75 MG/1; MG/1
100 CAPSULE ORAL 2 TIMES DAILY
Qty: 10 CAPSULE | Refills: 0 | Status: SHIPPED | OUTPATIENT
Start: 2019-04-03 | End: 2019-04-08

## 2019-04-03 RX ORDER — ONDANSETRON 4 MG/1
4 TABLET, ORALLY DISINTEGRATING ORAL EVERY 8 HOURS PRN
Qty: 9 TABLET | Refills: 0 | Status: SHIPPED | OUTPATIENT
Start: 2019-04-03 | End: 2019-04-06

## 2019-04-03 RX ORDER — PROMETHAZINE HYDROCHLORIDE 25 MG/ML
INJECTION, SOLUTION INTRAMUSCULAR; INTRAVENOUS
Status: DISCONTINUED
Start: 2019-04-03 | End: 2019-04-03 | Stop reason: HOSPADM

## 2019-04-03 RX ORDER — ONDANSETRON 2 MG/ML
4 INJECTION INTRAMUSCULAR; INTRAVENOUS
Status: COMPLETED | OUTPATIENT
Start: 2019-04-03 | End: 2019-04-03

## 2019-04-03 RX ADMIN — SODIUM CHLORIDE 1000 ML: 0.9 INJECTION, SOLUTION INTRAVENOUS at 12:04

## 2019-04-03 RX ADMIN — PROMETHAZINE HYDROCHLORIDE 12.5 MG: 25 INJECTION INTRAMUSCULAR; INTRAVENOUS at 01:04

## 2019-04-03 RX ADMIN — ONDANSETRON 4 MG: 2 INJECTION INTRAMUSCULAR; INTRAVENOUS at 01:04

## 2019-04-03 RX ADMIN — IOHEXOL 75 ML: 350 INJECTION, SOLUTION INTRAVENOUS at 02:04

## 2019-04-03 RX ADMIN — IOHEXOL 50 ML: 350 INJECTION, SOLUTION INTRAVENOUS at 02:04

## 2019-04-03 NOTE — ED NOTES
Patient sitting up on bedside, awake, alert, and calm, NADN, RR even and unlabored, call bell within reach, bed in lowest position and locked. Patient denies needs at this time, instructed to call for needs, patient verbalized understanding

## 2019-04-03 NOTE — ED PROVIDER NOTES
Encounter Date: 4/3/2019       History     Chief Complaint   Patient presents with    Nausea     Wilmer Honeycutt is a 23 y.o. 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  Patient reports vomiting greater than 20 times since yesterday.  Reports associated chills and body aches but denies fever.  Reports lower abdominal pain described as aching rated 4 to 5/10 on pain scale.  Denies dysuria, urgency, or frequency.  Denies vaginal pain, leaking, or lesions. Reports anal pain due to recent surgery.  Denies purulent drainage. Denies evidence of abscess.  Reports regular bowel movements daily.  Denies diarrhea.  Denies blood or mucus in stool. Patient is afebrile here today.     The history is provided by the patient.     Review of patient's allergies indicates:   Allergen Reactions    Pickles [cucumber fruit extract] Anaphylaxis and Swelling     Past Medical History:   Diagnosis Date    Anemia      Past Surgical History:   Procedure Laterality Date    FULGURATION, CONDYLOMA, ANUS, prone N/A 4/1/2019    Performed by Harsh Yeung MD at [facility] OR [address] [place] [address]     Family History   Problem Relation Age of Onset    Ovarian cancer Maternal Grandmother     Bladder Cancer Mother     Colon cancer Neg Hx     Breast cancer Neg Hx      Social History     Tobacco Use    Smoking status: Current Every Day Smoker     Packs/day: 0.25     Years: 10.00     Pack years: 2.50     Types: Cigarettes    Smokeless tobacco: Never Used   Substance Use Topics    Alcohol use: Yes     Alcohol/week: 0.6 oz     Types: 1 Glasses of wine per week     Frequency: Never     Comment: sips     Drug use: No     Review of Systems   Constitutional: Positive for chills. Negative for activity change and fever.   HENT: Negative.  Negative for congestion, ear discharge, ear pain, postnasal drip, sinus pressure, sinus pain and sore throat.     Eyes: Negative.    Respiratory: Negative.  Negative for cough, chest tightness and shortness of breath.    Cardiovascular: Negative.  Negative for chest pain.   Gastrointestinal: Positive for abdominal pain, nausea and vomiting. Negative for abdominal distention and diarrhea.        Lower abdominal pain. Intermittent nausea with vomiting x 2 days.   Endocrine: Negative.    Genitourinary: Negative.  Negative for dysuria, frequency, urgency, vaginal bleeding, vaginal discharge and vaginal pain.        Anal pain s/p fulguration of anal condyloma.    Musculoskeletal: Positive for myalgias. Negative for back pain.   Skin: Negative.  Negative for rash.   Allergic/Immunologic: Negative.    Neurological: Negative.  Negative for dizziness, weakness, light-headedness and numbness.   Hematological: Negative.  Does not bruise/bleed easily.   Psychiatric/Behavioral: Negative.        Physical Exam     Initial Vitals [04/03/19 1141]   BP Pulse Resp Temp SpO2   (!) 113/57 106 20 96.4 °F (35.8 °C) 98 %      MAP       --         Physical Exam    Nursing note and vitals reviewed.  Constitutional: She appears well-developed and well-nourished.   HENT:   Head: Normocephalic and atraumatic.   Right Ear: Tympanic membrane, external ear and ear canal normal.   Left Ear: Tympanic membrane, external ear and ear canal normal.   Nose: Nose normal. No mucosal edema or rhinorrhea. Right sinus exhibits no maxillary sinus tenderness and no frontal sinus tenderness. Left sinus exhibits no maxillary sinus tenderness and no frontal sinus tenderness.   Mouth/Throat: Uvula is midline, oropharynx is clear and moist and mucous membranes are normal. No posterior oropharyngeal erythema.   Eyes: Conjunctivae and EOM are normal. Pupils are equal, round, and reactive to light.   Neck: Normal range of motion. Neck supple.   Cardiovascular: Normal rate, regular rhythm, normal heart sounds and intact distal pulses.   Pulmonary/Chest: Effort normal and breath  sounds normal. She has no decreased breath sounds. She has no wheezes. She has no rhonchi. She has no rales.   Abdominal: Soft. Bowel sounds are normal. She exhibits no distension. There is tenderness in the suprapubic area. There is no rigidity, no rebound, no guarding, no CVA tenderness, no tenderness at McBurney's point and negative Carson's sign. No hernia.   Abdomen soft and nondistended.  TTP suprapubic area.  No rebound or guarding noted. Negative Rovsing's, negative Carson's.   Genitourinary: Vagina normal.   Genitourinary Comments: Multiple small fulgurated areas around anus s/p fulguration of condyloma. No surrounding erythema, no drainage, no areas of induration or fluctuance.    Musculoskeletal: Normal range of motion.   Neurological: She is alert and oriented to person, place, and time. She has normal strength. She displays normal reflexes. No cranial nerve deficit or sensory deficit.   Skin: Skin is warm and dry. Capillary refill takes less than 2 seconds. No rash noted.   Psychiatric: She has a normal mood and affect. Her behavior is normal. Judgment and thought content normal.         ED Course   Procedures  Labs Reviewed   CBC W/ AUTO DIFFERENTIAL - Abnormal; Notable for the following components:       Result Value    WBC 15.10 (*)     RBC 3.99 (*)     Hemoglobin 11.3 (*)     Hematocrit 34.9 (*)     RDW 14.8 (*)     Gran # (ANC) 13.1 (*)     Gran% 86.8 (*)     Lymph% 6.4 (*)     All other components within normal limits   COMPREHENSIVE METABOLIC PANEL - Abnormal; Notable for the following components:    CO2 21 (*)     Albumin 3.3 (*)     AST 43 (*)     ALT 55 (*)     All other components within normal limits   URINALYSIS, REFLEX TO URINE CULTURE - Abnormal; Notable for the following components:    Appearance, UA Cloudy (*)     Specific Gravity, UA >=1.030 (*)     Protein, UA 2+ (*)     Ketones, UA Trace (*)     Bilirubin (UA) 1+ (*)     Occult Blood UA 1+ (*)     Leukocytes, UA 1+ (*)     All other  components within normal limits    Narrative:     Preferred Collection Type->Urine, Clean Catch   URINALYSIS MICROSCOPIC - Abnormal; Notable for the following components:    RBC, UA 5 (*)     WBC, UA >100 (*)     Bacteria, UA Many (*)     All other components within normal limits    Narrative:     Preferred Collection Type->Urine, Clean Catch   INFLUENZA A & B BY MOLECULAR   CULTURE, URINE   LIPASE   PREGNANCY TEST, URINE RAPID          Imaging Results          CT Abdomen Pelvis With Contrast (Final result)  Result time 04/03/19 14:47:39    Final result by Lisa Flores MD (04/03/19 14:47:39)                 Impression:      No acute imaging abnormality is identified to correspond to the patient's given clinical symptoms.  Specifically, the appendix is normal.    Constipation.      Electronically signed by: Lisa Flores MD  Date:    04/03/2019  Time:    14:47             Narrative:    EXAMINATION:  CT ABDOMEN PELVIS WITH CONTRAST    CLINICAL HISTORY:  Infection, abdomen-pelvis;Nausea, vomiting, diarrhea;RLQ pain, appendicitis suspected;    TECHNIQUE:  Low dose axial images, sagittal and coronal reformations were obtained from the lung bases to the pubic symphysis following the IV administration of 75 mL of Omnipaque 350 and the oral administration of 30 mL of Omnipaque 350.    COMPARISON:  None.    FINDINGS:  The lung bases are clear.  The base of the heart appears normal.  The aorta is of normal caliber and tapers appropriately.    No radiopaque gallstones are seen.  No intrahepatic or extrahepatic biliary ductal dilatation is identified.  The liver, spleen, pancreas, adrenal glands and kidneys are normal in size, shape and contour without focal lesions.  The urinary bladder is well distended and appears normal.  Uterus and adnexa appear normal.    Constipation.  Bowel appears normal.  No free air, free fluid or obstruction.  No pathologically enlarged lymph nodes.    Skeletal structures are intact.                                   Medications   promethazine (PHENERGAN) 25 mg/mL injection (has no administration in time range)   0.9%  NaCl infusion (0 mLs Intravenous Stopped 4/3/19 1400)   ondansetron injection 4 mg (4 mg Intravenous Given 4/3/19 1303)   iohexol (OMNIPAQUE 350) injection 30 mL (50 mLs Oral Given 4/3/19 1425)   iohexol (OMNIPAQUE 350) injection 75 mL (75 mLs Intravenous Given 4/3/19 1425)   promethazine (PHENERGAN) 12.5 mg in dextrose 5 % 50 mL IVPB (0 mg Intravenous Stopped 4/3/19 1406)                          Clinical Impression:       ICD-10-CM ICD-9-CM   1. Acute cystitis without hematuria N30.00 595.0         Disposition:   Disposition: Discharged  Condition: Stable    Discharge Medication List as of 4/3/2019  3:20 PM      START taking these medications    Details   nitrofurantoin, macrocrystal-monohydrate, (MACROBID) 100 MG capsule Take 1 capsule (100 mg total) by mouth 2 (two) times daily. for 5 days, Starting Wed 4/3/2019, Until Mon 4/8/2019, Normal      ondansetron (ZOFRAN-ODT) 4 MG TbDL Take 1 tablet (4 mg total) by mouth every 8 (eight) hours as needed (nausea)., Starting Wed 4/3/2019, Until Sat 4/6/2019, Normal            The patient acknowledges that close follow up with medical provider is required. Instructed to follow up with PCP within 2 days. Patient was given specific return precautions. The patient agrees to comply with all instruction and directions given in the ER.                  Effie Holloway NP  04/03/19 3674

## 2019-04-03 NOTE — ED NOTES
The patient was seen, evaluated and discharged.  All questions were asked and/or answered and the pt was discharged with written and verbal instructions.  Discharged to home/self care.    - Condition at discharge: Good  - Mode of Discharge: Ambulatory  - The patient left the ED accompanied by a family member.  - The discharge instructions were discussed with the patient.  - Patient state an understanding of the discharge instructions.

## 2019-04-03 NOTE — ED TRIAGE NOTES
"Pt reports had sx Monday to remove genital warts. States took meds on empty stomach yesterday and "hasn't felt right" since. Reports n/v  "

## 2019-04-04 LAB
BACTERIA UR CULT: NORMAL
BACTERIA UR CULT: NORMAL

## 2019-04-09 ENCOUNTER — TELEPHONE (OUTPATIENT)
Dept: SURGERY | Facility: CLINIC | Age: 24
End: 2019-04-09

## 2019-04-09 NOTE — TELEPHONE ENCOUNTER
----- Message from Geneva Worthy sent at 4/9/2019  9:47 AM CDT -----  Contact: pt#[phone]  Pt is calling for results. She states that her warts was tested

## 2019-04-10 ENCOUNTER — TELEPHONE (OUTPATIENT)
Dept: PHARMACY | Facility: CLINIC | Age: 24
End: 2019-04-10

## 2019-04-22 PROBLEM — Z37.9 NORMAL LABOR: Status: RESOLVED | Noted: 2019-01-16 | Resolved: 2019-04-22

## 2019-04-23 ENCOUNTER — TELEPHONE (OUTPATIENT)
Dept: OBSTETRICS AND GYNECOLOGY | Facility: CLINIC | Age: 24
End: 2019-04-23

## 2019-04-23 NOTE — TELEPHONE ENCOUNTER
Attempted to contact pt to inform her that we have received her iud and to schedule insertion. Unable to reach pt, kamlam to contact the office.

## 2019-05-16 RX ORDER — MEDROXYPROGESTERONE ACETATE 150 MG/ML
150 INJECTION, SUSPENSION INTRAMUSCULAR
Qty: 1 ML | Refills: 0 | Status: SHIPPED | OUTPATIENT
Start: 2019-05-16 | End: 2019-06-21

## 2019-05-23 ENCOUNTER — TELEPHONE (OUTPATIENT)
Dept: OBSTETRICS AND GYNECOLOGY | Facility: CLINIC | Age: 24
End: 2019-05-23

## 2019-05-23 NOTE — TELEPHONE ENCOUNTER
Explained to pt that she would need to call when her cycle starts for the insertion. Pt verbally understood and will call when her cycle starts.

## 2019-05-23 NOTE — TELEPHONE ENCOUNTER
----- Message from Xochitlbinh Hardin sent at 5/23/2019 10:28 AM CDT -----  Contact: Self  Wilmer Honeycutt  MRN: [id number]  Home Phone      [phone]  Work Phone      Not on file.  Mobile          [phone]    Patient Care Team:  Primary Doctor No as PCP - General  Tita Castillo MD (Obstetrics and Gynecology)  OB? No  What phone number can you be reached at? [phone]   Message:   Pt states that the pharmacy keeps calling her about her Mirena being ready. Would like to know if she can have it put in today. Please advise. If she doesn't answer, leave message.

## 2019-05-30 ENCOUNTER — PATIENT MESSAGE (OUTPATIENT)
Dept: ADMINISTRATIVE | Facility: OTHER | Age: 24
End: 2019-05-30

## 2019-06-13 ENCOUNTER — TELEPHONE (OUTPATIENT)
Dept: OBSTETRICS AND GYNECOLOGY | Facility: CLINIC | Age: 24
End: 2019-06-13

## 2019-06-13 NOTE — TELEPHONE ENCOUNTER
Attempted to contact pt on both numbers provided in pts chart to inform her that we have received her iud and to schedule insertion. Unable to reach ptkamlam to contact the office.

## 2019-06-21 ENCOUNTER — PROCEDURE VISIT (OUTPATIENT)
Dept: OBSTETRICS AND GYNECOLOGY | Facility: CLINIC | Age: 24
End: 2019-06-21
Payer: MEDICAID

## 2019-06-21 VITALS
WEIGHT: 192.19 LBS | BODY MASS INDEX: 34.05 KG/M2 | RESPIRATION RATE: 13 BRPM | SYSTOLIC BLOOD PRESSURE: 120 MMHG | HEIGHT: 63 IN | HEART RATE: 88 BPM | DIASTOLIC BLOOD PRESSURE: 76 MMHG

## 2019-06-21 DIAGNOSIS — Z71.3 WEIGHT LOSS COUNSELING, ENCOUNTER FOR: ICD-10-CM

## 2019-06-21 DIAGNOSIS — Z30.430 ENCOUNTER FOR IUD INSERTION: Primary | ICD-10-CM

## 2019-06-21 LAB
B-HCG UR QL: NEGATIVE
CTP QC/QA: YES

## 2019-06-21 PROCEDURE — 58300 INSERTION OF IUD: ICD-10-PCS | Mod: S$PBB,,, | Performed by: OBSTETRICS & GYNECOLOGY

## 2019-06-21 PROCEDURE — 81025 URINE PREGNANCY TEST: CPT | Mod: PBBFAC | Performed by: OBSTETRICS & GYNECOLOGY

## 2019-06-21 PROCEDURE — 58300 INSERT INTRAUTERINE DEVICE: CPT | Mod: PBBFAC | Performed by: OBSTETRICS & GYNECOLOGY

## 2019-06-21 RX ORDER — FLUOXETINE HYDROCHLORIDE 40 MG/1
40 CAPSULE ORAL DAILY
Qty: 30 CAPSULE | Refills: 2 | Status: SHIPPED | OUTPATIENT
Start: 2019-06-21 | End: 2019-09-24

## 2019-06-21 RX ORDER — PHENTERMINE HYDROCHLORIDE 37.5 MG/1
37.5 TABLET ORAL
Qty: 30 TABLET | Refills: 0 | Status: SHIPPED | OUTPATIENT
Start: 2019-06-21

## 2019-06-21 RX ADMIN — LEVONORGESTREL 1 INTRA UTERINE DEVICE: 52 INTRAUTERINE DEVICE INTRAUTERINE at 02:06

## 2019-06-21 NOTE — PROCEDURES
Insertion of IUD  Date/Time: 6/21/2019 2:18 PM  Performed by: Shaista Christy MD  Authorized by: Shaista Christy MD     Consent:     Consent obtained:  Written    Consent given by:  Patient    Procedure risks and benefits discussed: yes      Patient questions answered: yes      Patient agrees, verbalizes understanding, and wants to proceed: yes      Educational handouts given: yes      Instructions and paperwork completed: yes    Procedure:     Pelvic exam performed: yes      Negative urine pregnancy test: yes      Cervix cleaned and prepped: yes      Speculum placed in vagina: yes      Tenaculum applied to cervix: yes      Uterus sounded: yes      Uterus sound depth (cm):  7    IUD inserted with no complications: yes      IUD type:  Mirena    Strings trimmed: yes    Post-procedure:     Patient tolerated procedure well: yes      Patient will follow up after next period: yes    Comments:      Patient also requesting Adipex today; Discussed FDA approved medications for weight loss assistance including prescription and OTC options.  Discussed Adipex's method of action, and patient expresses an understanding that this medication is to be used with a healthy diet plan and exercise regimen. She understands that, per [employer] regulations, she will follow up each month for a weight check as well as a blood pressure check.  She must demonstrate weight loss to continue this medication and receive additional refills.  She understands that she may not use this medication if she is pregnant or trying to conceive.  All questions answered.

## 2019-09-24 RX ORDER — FLUOXETINE HYDROCHLORIDE 40 MG/1
80 CAPSULE ORAL DAILY
Qty: 60 CAPSULE | Refills: 1 | Status: SHIPPED | OUTPATIENT
Start: 2019-09-24 | End: 2019-09-24

## 2019-09-24 RX ORDER — FLUOXETINE HYDROCHLORIDE 40 MG/1
80 CAPSULE ORAL DAILY
Qty: 60 CAPSULE | Refills: 1 | Status: SHIPPED | OUTPATIENT
Start: 2019-09-24 | End: 2019-10-29 | Stop reason: SDUPTHER

## 2019-09-24 NOTE — TELEPHONE ENCOUNTER
----- Message from Argenis Jordan MA sent at 9/24/2019  2:04 PM CDT -----  Contact: Self      ----- Message -----  From: Candi Ballesteros  Sent: 9/24/2019   1:59 PM CDT  To: [facility] Staff    Wilmer Honeycutt  MRN: [id number]  Home Phone      [phone]  Work Phone      Not on file.  Mobile          [phone]    Patient Care Team:  Primary Doctor No as PCP - General  Tita Castillo MD (Obstetrics and Gynecology)  OB? No  What phone number can you be reached at? [phone]  Message:   Pt would like to know if she can increase her dosage of Prozac, please return call.

## 2019-09-24 NOTE — TELEPHONE ENCOUNTER
Patient notified, verbalized understanding.  Scheduled for followup, Pt request medication to be sent to [facility] in [place]. Medication pended.

## 2019-09-24 NOTE — TELEPHONE ENCOUNTER
Patient requesting to know if her prozac dosing can be increased. Patient currently taking 40mg. Last office visit 6/2019. Please advise.

## 2019-09-27 ENCOUNTER — TELEPHONE (OUTPATIENT)
Dept: OBSTETRICS AND GYNECOLOGY | Facility: CLINIC | Age: 24
End: 2019-09-27

## 2019-09-27 NOTE — TELEPHONE ENCOUNTER
----- Message from Argenis Jordan MA sent at 9/27/2019  3:47 PM CDT -----  Contact: self      ----- Message -----  From: Candi Ballesteros  Sent: 9/27/2019   3:11 PM CDT  To: Jonathan Rivers Staff    Wilmer Honeycutt  MRN: [id number]  Home Phone      [phone]  Work Phone      Not on file.  Mobile          [phone]    Patient Care Team:  Primary Doctor No as PCP - General  Tita Castillo MD (Obstetrics and Gynecology)  OB? No  What phone number can you be reached at? [phone]  Message:   Pt states she needs her Prozac sent to [facility] on [address] instead of [facility]. Please return call.

## 2019-09-27 NOTE — TELEPHONE ENCOUNTER
Pt notified rx was sent to [facility] pharmacy. [facility] [address] to transfer the rx. Pt aware.

## 2019-10-29 RX ORDER — FLUOXETINE HYDROCHLORIDE 40 MG/1
80 CAPSULE ORAL DAILY
Qty: 60 CAPSULE | Refills: 0 | Status: SHIPPED | OUTPATIENT
Start: 2019-10-29 | End: 2020-10-28

## 2019-10-29 NOTE — TELEPHONE ENCOUNTER
----- Message from Argenis Jordan MA sent at 10/29/2019 10:59 AM CDT -----  Contact: Self      ----- Message -----  From: Xochitl Wilfred  Sent: 10/29/2019   9:58 AM CDT  To: Jonathan Rivers Staff    Wilmer Honeycutt  MRN: [id number]  Home Phone      [phone]  Work Phone      Not on file.  Mobile          [phone]    Patient Care Team:  Primary Doctor No as PCP - General  Tita Castillo MD (Obstetrics and Gynecology)  OB? No  What phone number can you be reached at? [phone]  Message:   Pt states she moved out of [place] and needs Prozac and Adipex refilled. Her new provider will not refill until they see her. Would like to know if she can have a refill called in to [facility] in [place], [place]. Please advise.

## 2019-10-29 NOTE — TELEPHONE ENCOUNTER
Patient states that she moved out of state and needs refills on Prozac and Adipex. She states that she can not get prescription there until she is established, her appt is on 11/14/19. Last prescription of Adipex was given on 6/21/19. Last wwe 5/29/18, last office visit 6/21/19. Patient notified that Adipex prescriptions require monthly visits for refills, and that her last rx was in June so the physician may not refill that prescription. Patient verbalized understanding.

## 2021-05-10 ENCOUNTER — PATIENT MESSAGE (OUTPATIENT)
Dept: RESEARCH | Facility: HOSPITAL | Age: 26
End: 2021-05-10

## 2021-11-10 ENCOUNTER — TELEPHONE (OUTPATIENT)
Dept: OBSTETRICS AND GYNECOLOGY | Facility: CLINIC | Age: 26
End: 2021-11-10
Payer: MEDICAID

## (undated) DEVICE — MASK SILVER SURGICAL

## (undated) DEVICE — SEE MEDLINE ITEM 157117

## (undated) DEVICE — SPONGE GAUZE 16PLY 4X4

## (undated) DEVICE — GAUZE VASELINE PETRO 3X9

## (undated) DEVICE — TUBING NEPTUNE 2 SMOKE 10IN

## (undated) DEVICE — SEE MEDLINE ITEM 157148

## (undated) DEVICE — PAD ABD 8X10 STERILE

## (undated) DEVICE — TAPE SILK 3IN

## (undated) DEVICE — NDL 22GA X1 1/2 REG BEVEL

## (undated) DEVICE — SEE MEDLINE ITEM 146417

## (undated) DEVICE — LUBRICANT SURGILUBE 2 OZ

## (undated) DEVICE — SYR ONLY LUER LOCK 20CC

## (undated) DEVICE — TRAY MINOR GEN SURG

## (undated) DEVICE — ELECTRODE REM PLYHSV RETURN 9